# Patient Record
Sex: MALE | Race: WHITE | NOT HISPANIC OR LATINO | Employment: OTHER | URBAN - METROPOLITAN AREA
[De-identification: names, ages, dates, MRNs, and addresses within clinical notes are randomized per-mention and may not be internally consistent; named-entity substitution may affect disease eponyms.]

---

## 2017-11-27 ENCOUNTER — HOSPITAL ENCOUNTER (EMERGENCY)
Facility: HOSPITAL | Age: 77
Discharge: HOME | End: 2017-11-27
Attending: EMERGENCY MEDICINE | Admitting: EMERGENCY MEDICINE

## 2017-11-27 ENCOUNTER — APPOINTMENT (OUTPATIENT)
Dept: GENERAL RADIOLOGY | Facility: HOSPITAL | Age: 77
End: 2017-11-27

## 2017-11-27 VITALS
SYSTOLIC BLOOD PRESSURE: 168 MMHG | HEIGHT: 72 IN | DIASTOLIC BLOOD PRESSURE: 109 MMHG | WEIGHT: 150 LBS | HEART RATE: 72 BPM | RESPIRATION RATE: 16 BRPM | TEMPERATURE: 97.6 F | OXYGEN SATURATION: 98 % | BODY MASS INDEX: 20.32 KG/M2

## 2017-11-27 DIAGNOSIS — E86.0 DEHYDRATION: ICD-10-CM

## 2017-11-27 DIAGNOSIS — R53.1 WEAKNESS: ICD-10-CM

## 2017-11-27 DIAGNOSIS — R25.1 TREMOR: ICD-10-CM

## 2017-11-27 DIAGNOSIS — G20 PARKINSON'S DISEASE (TREMOR, STIFFNESS, SLOW MOTION, UNSTABLE POSTURE) (HCC): Primary | ICD-10-CM

## 2017-11-27 LAB
ALBUMIN SERPL-MCNC: 3.9 G/DL (ref 3.2–4.8)
ALBUMIN/GLOB SERPL: 1.4 G/DL (ref 1.5–2.5)
ALP SERPL-CCNC: 79 U/L (ref 25–100)
ALT SERPL W P-5'-P-CCNC: 3 U/L (ref 7–40)
ANION GAP SERPL CALCULATED.3IONS-SCNC: 4 MMOL/L (ref 3–11)
AST SERPL-CCNC: 14 U/L (ref 0–33)
BASOPHILS # BLD AUTO: 0.03 10*3/MM3 (ref 0–0.2)
BASOPHILS NFR BLD AUTO: 0.4 % (ref 0–1)
BILIRUB SERPL-MCNC: 0.6 MG/DL (ref 0.3–1.2)
BILIRUB UR QL STRIP: NEGATIVE
BUN BLD-MCNC: 22 MG/DL (ref 9–23)
BUN/CREAT SERPL: 27.5 (ref 7–25)
CALCIUM SPEC-SCNC: 8.6 MG/DL (ref 8.7–10.4)
CHLORIDE SERPL-SCNC: 103 MMOL/L (ref 99–109)
CLARITY UR: CLEAR
CO2 SERPL-SCNC: 30 MMOL/L (ref 20–31)
COLOR UR: YELLOW
CREAT BLD-MCNC: 0.8 MG/DL (ref 0.6–1.3)
CRP SERPL-MCNC: 0.01 MG/DL (ref 0–1)
DEPRECATED RDW RBC AUTO: 47.7 FL (ref 37–54)
EOSINOPHIL # BLD AUTO: 0.19 10*3/MM3 (ref 0–0.3)
EOSINOPHIL NFR BLD AUTO: 2.6 % (ref 0–3)
ERYTHROCYTE [DISTWIDTH] IN BLOOD BY AUTOMATED COUNT: 13.1 % (ref 11.3–14.5)
GFR SERPL CREATININE-BSD FRML MDRD: 94 ML/MIN/1.73
GLOBULIN UR ELPH-MCNC: 2.8 GM/DL
GLUCOSE BLD-MCNC: 101 MG/DL (ref 70–100)
GLUCOSE UR STRIP-MCNC: NEGATIVE MG/DL
HCT VFR BLD AUTO: 46.6 % (ref 38.9–50.9)
HGB BLD-MCNC: 15.3 G/DL (ref 13.1–17.5)
HGB UR QL STRIP.AUTO: NEGATIVE
HOLD SPECIMEN: NORMAL
IMM GRANULOCYTES # BLD: 0.01 10*3/MM3 (ref 0–0.03)
IMM GRANULOCYTES NFR BLD: 0.1 % (ref 0–0.6)
INR PPP: 1.04
KETONES UR QL STRIP: ABNORMAL
LEUKOCYTE ESTERASE UR QL STRIP.AUTO: NEGATIVE
LIPASE SERPL-CCNC: 29 U/L (ref 6–51)
LYMPHOCYTES # BLD AUTO: 1.72 10*3/MM3 (ref 0.6–4.8)
LYMPHOCYTES NFR BLD AUTO: 23.9 % (ref 24–44)
MAGNESIUM SERPL-MCNC: 2.2 MG/DL (ref 1.3–2.7)
MCH RBC QN AUTO: 32.8 PG (ref 27–31)
MCHC RBC AUTO-ENTMCNC: 32.8 G/DL (ref 32–36)
MCV RBC AUTO: 100 FL (ref 80–99)
MONOCYTES # BLD AUTO: 0.64 10*3/MM3 (ref 0–1)
MONOCYTES NFR BLD AUTO: 8.9 % (ref 0–12)
NEUTROPHILS # BLD AUTO: 4.61 10*3/MM3 (ref 1.5–8.3)
NEUTROPHILS NFR BLD AUTO: 64.1 % (ref 41–71)
NITRITE UR QL STRIP: NEGATIVE
PH UR STRIP.AUTO: 5.5 [PH] (ref 5–8)
PLATELET # BLD AUTO: 183 10*3/MM3 (ref 150–450)
PMV BLD AUTO: 10.6 FL (ref 6–12)
POTASSIUM BLD-SCNC: 3.6 MMOL/L (ref 3.5–5.5)
PROT SERPL-MCNC: 6.7 G/DL (ref 5.7–8.2)
PROT UR QL STRIP: NEGATIVE
PROTHROMBIN TIME: 11.3 SECONDS (ref 9.6–11.5)
RBC # BLD AUTO: 4.66 10*6/MM3 (ref 4.2–5.76)
SODIUM BLD-SCNC: 137 MMOL/L (ref 132–146)
SP GR UR STRIP: 1.03 (ref 1–1.03)
TSH SERPL DL<=0.05 MIU/L-ACNC: 1.61 MIU/ML (ref 0.35–5.35)
UROBILINOGEN UR QL STRIP: ABNORMAL
WBC NRBC COR # BLD: 7.2 10*3/MM3 (ref 3.5–10.8)
WHOLE BLOOD HOLD SPECIMEN: NORMAL
WHOLE BLOOD HOLD SPECIMEN: NORMAL

## 2017-11-27 PROCEDURE — 96360 HYDRATION IV INFUSION INIT: CPT

## 2017-11-27 PROCEDURE — 83690 ASSAY OF LIPASE: CPT | Performed by: EMERGENCY MEDICINE

## 2017-11-27 PROCEDURE — 99284 EMERGENCY DEPT VISIT MOD MDM: CPT

## 2017-11-27 PROCEDURE — 84443 ASSAY THYROID STIM HORMONE: CPT | Performed by: EMERGENCY MEDICINE

## 2017-11-27 PROCEDURE — 93005 ELECTROCARDIOGRAM TRACING: CPT | Performed by: EMERGENCY MEDICINE

## 2017-11-27 PROCEDURE — 83735 ASSAY OF MAGNESIUM: CPT | Performed by: EMERGENCY MEDICINE

## 2017-11-27 PROCEDURE — 86140 C-REACTIVE PROTEIN: CPT | Performed by: EMERGENCY MEDICINE

## 2017-11-27 PROCEDURE — 81003 URINALYSIS AUTO W/O SCOPE: CPT | Performed by: EMERGENCY MEDICINE

## 2017-11-27 PROCEDURE — 80053 COMPREHEN METABOLIC PANEL: CPT | Performed by: EMERGENCY MEDICINE

## 2017-11-27 PROCEDURE — 71010 HC CHEST PA OR AP: CPT

## 2017-11-27 PROCEDURE — 85025 COMPLETE CBC W/AUTO DIFF WBC: CPT | Performed by: EMERGENCY MEDICINE

## 2017-11-27 PROCEDURE — 85610 PROTHROMBIN TIME: CPT | Performed by: EMERGENCY MEDICINE

## 2017-11-27 RX ORDER — SODIUM CHLORIDE 0.9 % (FLUSH) 0.9 %
10 SYRINGE (ML) INJECTION AS NEEDED
Status: DISCONTINUED | OUTPATIENT
Start: 2017-11-27 | End: 2017-11-27 | Stop reason: HOSPADM

## 2017-11-27 RX ORDER — SODIUM CHLORIDE 9 MG/ML
125 INJECTION, SOLUTION INTRAVENOUS CONTINUOUS
Status: DISCONTINUED | OUTPATIENT
Start: 2017-11-27 | End: 2017-11-27 | Stop reason: HOSPADM

## 2017-11-27 RX ORDER — CLONAZEPAM 1 MG/1
1 TABLET ORAL 2 TIMES DAILY
COMMUNITY

## 2017-11-27 RX ORDER — CLONAZEPAM 0.5 MG/1
0.5 TABLET ORAL ONCE
Status: COMPLETED | OUTPATIENT
Start: 2017-11-27 | End: 2017-11-27

## 2017-11-27 RX ADMIN — SODIUM CHLORIDE 125 ML/HR: 9 INJECTION, SOLUTION INTRAVENOUS at 07:04

## 2017-11-27 RX ADMIN — CLONAZEPAM 0.5 MG: 0.5 TABLET ORAL at 06:51

## 2017-11-27 RX ADMIN — SODIUM CHLORIDE 1000 ML: 9 INJECTION, SOLUTION INTRAVENOUS at 06:38

## 2020-06-16 ENCOUNTER — TRANSCRIBE ORDERS (OUTPATIENT)
Dept: ADMINISTRATIVE | Facility: HOSPITAL | Age: 80
End: 2020-06-16

## 2020-06-16 ENCOUNTER — HOSPITAL ENCOUNTER (OUTPATIENT)
Dept: GENERAL RADIOLOGY | Facility: HOSPITAL | Age: 80
Discharge: HOME | End: 2020-06-16
Admitting: PHYSICIAN ASSISTANT

## 2020-06-16 DIAGNOSIS — S99.912A LEFT ANKLE INJURY, INITIAL ENCOUNTER: Primary | ICD-10-CM

## 2020-06-16 DIAGNOSIS — S99.912A LEFT ANKLE INJURY, INITIAL ENCOUNTER: ICD-10-CM

## 2020-06-16 PROCEDURE — 73610 X-RAY EXAM OF ANKLE: CPT

## 2020-10-28 ENCOUNTER — APPOINTMENT (OUTPATIENT)
Dept: GENERAL RADIOLOGY | Facility: HOSPITAL | Age: 80
End: 2020-10-28

## 2020-10-28 ENCOUNTER — HOSPITAL ENCOUNTER (OUTPATIENT)
Facility: HOSPITAL | Age: 80
Setting detail: OBSERVATION
Discharge: HOME | End: 2020-10-30
Attending: EMERGENCY MEDICINE | Admitting: FAMILY MEDICINE

## 2020-10-28 DIAGNOSIS — R55 SYNCOPE, UNSPECIFIED SYNCOPE TYPE: Primary | ICD-10-CM

## 2020-10-28 DIAGNOSIS — I10 ESSENTIAL HYPERTENSION: ICD-10-CM

## 2020-10-28 DIAGNOSIS — R55 SYNCOPE AND COLLAPSE: ICD-10-CM

## 2020-10-28 DIAGNOSIS — R03.0 ELEVATED BLOOD PRESSURE READING: ICD-10-CM

## 2020-10-28 DIAGNOSIS — I95.1 ORTHOSTASIS: ICD-10-CM

## 2020-10-28 DIAGNOSIS — G24.01 TARDIVE DYSKINESIA: ICD-10-CM

## 2020-10-28 DIAGNOSIS — G20 PARKINSON'S DISEASE (HCC): Chronic | ICD-10-CM

## 2020-10-28 PROBLEM — D72.829 LEUKOCYTOSIS: Status: ACTIVE | Noted: 2020-10-28

## 2020-10-28 LAB
ALBUMIN SERPL-MCNC: 4.1 G/DL (ref 3.5–5.2)
ALBUMIN/GLOB SERPL: 1.5 G/DL
ALP SERPL-CCNC: 102 U/L (ref 39–117)
ALT SERPL W P-5'-P-CCNC: <5 U/L (ref 1–41)
AMMONIA BLD-SCNC: 20 UMOL/L (ref 16–60)
ANION GAP SERPL CALCULATED.3IONS-SCNC: 11 MMOL/L (ref 5–15)
AST SERPL-CCNC: 13 U/L (ref 1–40)
BACTERIA UR QL AUTO: ABNORMAL /HPF
BASOPHILS # BLD AUTO: 0.03 10*3/MM3 (ref 0–0.2)
BASOPHILS NFR BLD AUTO: 0.3 % (ref 0–1.5)
BILIRUB SERPL-MCNC: 0.7 MG/DL (ref 0–1.2)
BILIRUB UR QL STRIP: NEGATIVE
BUN SERPL-MCNC: 18 MG/DL (ref 8–23)
BUN/CREAT SERPL: 18.4 (ref 7–25)
CALCIUM SPEC-SCNC: 9.1 MG/DL (ref 8.6–10.5)
CHLORIDE SERPL-SCNC: 101 MMOL/L (ref 98–107)
CLARITY UR: CLEAR
CO2 SERPL-SCNC: 29 MMOL/L (ref 22–29)
COLOR UR: YELLOW
CREAT SERPL-MCNC: 0.98 MG/DL (ref 0.76–1.27)
DEPRECATED RDW RBC AUTO: 44.4 FL (ref 37–54)
EOSINOPHIL # BLD AUTO: 0.06 10*3/MM3 (ref 0–0.4)
EOSINOPHIL NFR BLD AUTO: 0.5 % (ref 0.3–6.2)
ERYTHROCYTE [DISTWIDTH] IN BLOOD BY AUTOMATED COUNT: 12.1 % (ref 12.3–15.4)
GFR SERPL CREATININE-BSD FRML MDRD: 74 ML/MIN/1.73
GLOBULIN UR ELPH-MCNC: 2.8 GM/DL
GLUCOSE SERPL-MCNC: 142 MG/DL (ref 65–99)
GLUCOSE UR STRIP-MCNC: NEGATIVE MG/DL
HCT VFR BLD AUTO: 45.9 % (ref 37.5–51)
HGB BLD-MCNC: 15 G/DL (ref 13–17.7)
HGB UR QL STRIP.AUTO: NEGATIVE
HOLD SPECIMEN: NORMAL
HOLD SPECIMEN: NORMAL
HYALINE CASTS UR QL AUTO: ABNORMAL /LPF
IMM GRANULOCYTES # BLD AUTO: 0.05 10*3/MM3 (ref 0–0.05)
IMM GRANULOCYTES NFR BLD AUTO: 0.4 % (ref 0–0.5)
KETONES UR QL STRIP: ABNORMAL
LEUKOCYTE ESTERASE UR QL STRIP.AUTO: ABNORMAL
LYMPHOCYTES # BLD AUTO: 1.72 10*3/MM3 (ref 0.7–3.1)
LYMPHOCYTES NFR BLD AUTO: 15.2 % (ref 19.6–45.3)
MAGNESIUM SERPL-MCNC: 2.1 MG/DL (ref 1.6–2.4)
MCH RBC QN AUTO: 32.7 PG (ref 26.6–33)
MCHC RBC AUTO-ENTMCNC: 32.7 G/DL (ref 31.5–35.7)
MCV RBC AUTO: 100 FL (ref 79–97)
MONOCYTES # BLD AUTO: 0.74 10*3/MM3 (ref 0.1–0.9)
MONOCYTES NFR BLD AUTO: 6.5 % (ref 5–12)
NEUTROPHILS NFR BLD AUTO: 77.1 % (ref 42.7–76)
NEUTROPHILS NFR BLD AUTO: 8.74 10*3/MM3 (ref 1.7–7)
NITRITE UR QL STRIP: NEGATIVE
NRBC BLD AUTO-RTO: 0 /100 WBC (ref 0–0.2)
PH UR STRIP.AUTO: 6 [PH] (ref 5–8)
PLATELET # BLD AUTO: 193 10*3/MM3 (ref 140–450)
PMV BLD AUTO: 10 FL (ref 6–12)
POTASSIUM SERPL-SCNC: 3.7 MMOL/L (ref 3.5–5.2)
PROCALCITONIN SERPL-MCNC: <0.02 NG/ML (ref 0–0.25)
PROT SERPL-MCNC: 6.9 G/DL (ref 6–8.5)
PROT UR QL STRIP: ABNORMAL
RBC # BLD AUTO: 4.59 10*6/MM3 (ref 4.14–5.8)
RBC # UR: ABNORMAL /HPF
REF LAB TEST METHOD: ABNORMAL
SODIUM SERPL-SCNC: 141 MMOL/L (ref 136–145)
SP GR UR STRIP: 1.01 (ref 1–1.03)
SQUAMOUS #/AREA URNS HPF: ABNORMAL /HPF
TROPONIN T SERPL-MCNC: <0.01 NG/ML (ref 0–0.03)
UROBILINOGEN UR QL STRIP: ABNORMAL
WBC # BLD AUTO: 11.34 10*3/MM3 (ref 3.4–10.8)
WBC UR QL AUTO: ABNORMAL /HPF
WHOLE BLOOD HOLD SPECIMEN: NORMAL
WHOLE BLOOD HOLD SPECIMEN: NORMAL
YEAST URNS QL MICRO: ABNORMAL /HPF

## 2020-10-28 PROCEDURE — 71045 X-RAY EXAM CHEST 1 VIEW: CPT

## 2020-10-28 PROCEDURE — 84484 ASSAY OF TROPONIN QUANT: CPT | Performed by: EMERGENCY MEDICINE

## 2020-10-28 PROCEDURE — 85025 COMPLETE CBC W/AUTO DIFF WBC: CPT | Performed by: EMERGENCY MEDICINE

## 2020-10-28 PROCEDURE — 25010000002 HYDRALAZINE PER 20 MG: Performed by: NURSE PRACTITIONER

## 2020-10-28 PROCEDURE — 99285 EMERGENCY DEPT VISIT HI MDM: CPT

## 2020-10-28 PROCEDURE — G0378 HOSPITAL OBSERVATION PER HR: HCPCS

## 2020-10-28 PROCEDURE — 99220 PR INITIAL OBSERVATION CARE/DAY 70 MINUTES: CPT | Performed by: INTERNAL MEDICINE

## 2020-10-28 PROCEDURE — 83735 ASSAY OF MAGNESIUM: CPT | Performed by: EMERGENCY MEDICINE

## 2020-10-28 PROCEDURE — 96372 THER/PROPH/DIAG INJ SC/IM: CPT

## 2020-10-28 PROCEDURE — 87086 URINE CULTURE/COLONY COUNT: CPT | Performed by: FAMILY MEDICINE

## 2020-10-28 PROCEDURE — 25010000002 ENOXAPARIN PER 10 MG: Performed by: NURSE PRACTITIONER

## 2020-10-28 PROCEDURE — 81001 URINALYSIS AUTO W/SCOPE: CPT | Performed by: EMERGENCY MEDICINE

## 2020-10-28 PROCEDURE — 25010000002 MIDAZOLAM PER 1 MG: Performed by: EMERGENCY MEDICINE

## 2020-10-28 PROCEDURE — 93005 ELECTROCARDIOGRAM TRACING: CPT | Performed by: EMERGENCY MEDICINE

## 2020-10-28 PROCEDURE — 87077 CULTURE AEROBIC IDENTIFY: CPT | Performed by: FAMILY MEDICINE

## 2020-10-28 PROCEDURE — 82140 ASSAY OF AMMONIA: CPT | Performed by: EMERGENCY MEDICINE

## 2020-10-28 PROCEDURE — 84145 PROCALCITONIN (PCT): CPT | Performed by: NURSE PRACTITIONER

## 2020-10-28 PROCEDURE — 80053 COMPREHEN METABOLIC PANEL: CPT | Performed by: EMERGENCY MEDICINE

## 2020-10-28 PROCEDURE — C9803 HOPD COVID-19 SPEC COLLECT: HCPCS

## 2020-10-28 PROCEDURE — 96375 TX/PRO/DX INJ NEW DRUG ADDON: CPT

## 2020-10-28 PROCEDURE — U0004 COV-19 TEST NON-CDC HGH THRU: HCPCS | Performed by: NURSE PRACTITIONER

## 2020-10-28 PROCEDURE — 99204 OFFICE O/P NEW MOD 45 MIN: CPT | Performed by: PSYCHIATRY & NEUROLOGY

## 2020-10-28 RX ORDER — ONDANSETRON 4 MG/1
4 TABLET, FILM COATED ORAL EVERY 6 HOURS PRN
Status: DISCONTINUED | OUTPATIENT
Start: 2020-10-28 | End: 2020-10-30 | Stop reason: HOSPADM

## 2020-10-28 RX ORDER — SODIUM CHLORIDE 0.9 % (FLUSH) 0.9 %
10 SYRINGE (ML) INJECTION AS NEEDED
Status: DISCONTINUED | OUTPATIENT
Start: 2020-10-28 | End: 2020-10-30 | Stop reason: HOSPADM

## 2020-10-28 RX ORDER — DOCUSATE SODIUM 100 MG/1
100 CAPSULE, LIQUID FILLED ORAL 2 TIMES DAILY
Status: DISCONTINUED | OUTPATIENT
Start: 2020-10-28 | End: 2020-10-30 | Stop reason: HOSPADM

## 2020-10-28 RX ORDER — HYDRALAZINE HYDROCHLORIDE 20 MG/ML
5 INJECTION INTRAMUSCULAR; INTRAVENOUS ONCE
Status: COMPLETED | OUTPATIENT
Start: 2020-10-28 | End: 2020-10-28

## 2020-10-28 RX ORDER — CLONAZEPAM 0.5 MG/1
0.5 TABLET ORAL 2 TIMES DAILY PRN
Status: DISCONTINUED | OUTPATIENT
Start: 2020-10-28 | End: 2020-10-30 | Stop reason: HOSPADM

## 2020-10-28 RX ORDER — ACETAMINOPHEN 325 MG/1
650 TABLET ORAL EVERY 4 HOURS PRN
Status: DISCONTINUED | OUTPATIENT
Start: 2020-10-28 | End: 2020-10-30 | Stop reason: HOSPADM

## 2020-10-28 RX ORDER — FLUDROCORTISONE ACETATE 0.1 MG/1
0.1 TABLET ORAL DAILY
Status: ON HOLD | COMMUNITY
End: 2020-10-30 | Stop reason: SDUPTHER

## 2020-10-28 RX ORDER — SODIUM CHLORIDE 0.9 % (FLUSH) 0.9 %
10 SYRINGE (ML) INJECTION EVERY 12 HOURS SCHEDULED
Status: DISCONTINUED | OUTPATIENT
Start: 2020-10-28 | End: 2020-10-30 | Stop reason: HOSPADM

## 2020-10-28 RX ORDER — CHOLECALCIFEROL (VITAMIN D3) 125 MCG
5 CAPSULE ORAL NIGHTLY PRN
Status: DISCONTINUED | OUTPATIENT
Start: 2020-10-28 | End: 2020-10-30 | Stop reason: HOSPADM

## 2020-10-28 RX ORDER — CALCIUM CARBONATE 750 MG/1
750 TABLET, CHEWABLE ORAL 2 TIMES DAILY PRN
Status: DISCONTINUED | OUTPATIENT
Start: 2020-10-28 | End: 2020-10-30 | Stop reason: HOSPADM

## 2020-10-28 RX ORDER — FLUDROCORTISONE ACETATE 0.1 MG/1
0.1 TABLET ORAL DAILY
Status: DISCONTINUED | OUTPATIENT
Start: 2020-10-28 | End: 2020-10-30 | Stop reason: HOSPADM

## 2020-10-28 RX ORDER — ONDANSETRON 2 MG/ML
4 INJECTION INTRAMUSCULAR; INTRAVENOUS EVERY 6 HOURS PRN
Status: DISCONTINUED | OUTPATIENT
Start: 2020-10-28 | End: 2020-10-30 | Stop reason: HOSPADM

## 2020-10-28 RX ORDER — MIDAZOLAM HYDROCHLORIDE 1 MG/ML
2 INJECTION INTRAMUSCULAR; INTRAVENOUS ONCE
Status: COMPLETED | OUTPATIENT
Start: 2020-10-28 | End: 2020-10-28

## 2020-10-28 RX ADMIN — CARBIDOPA AND LEVODOPA 1 TABLET: 25; 100 TABLET ORAL at 20:30

## 2020-10-28 RX ADMIN — DOCUSATE SODIUM 100 MG: 100 CAPSULE ORAL at 20:30

## 2020-10-28 RX ADMIN — MELATONIN TAB 5 MG 5 MG: 5 TAB at 20:30

## 2020-10-28 RX ADMIN — HYDRALAZINE HYDROCHLORIDE 5 MG: 20 INJECTION, SOLUTION INTRAMUSCULAR; INTRAVENOUS at 22:32

## 2020-10-28 RX ADMIN — FLUDROCORTISONE ACETATE 0.1 MG: 0.1 TABLET ORAL at 20:32

## 2020-10-28 RX ADMIN — ENOXAPARIN SODIUM 40 MG: 40 INJECTION SUBCUTANEOUS at 18:57

## 2020-10-28 RX ADMIN — SODIUM CHLORIDE, PRESERVATIVE FREE 10 ML: 5 INJECTION INTRAVENOUS at 20:30

## 2020-10-28 RX ADMIN — SODIUM CHLORIDE 1000 ML: 9 INJECTION, SOLUTION INTRAVENOUS at 14:24

## 2020-10-28 RX ADMIN — MIDAZOLAM 2 MG: 1 INJECTION INTRAMUSCULAR; INTRAVENOUS at 14:02

## 2020-10-29 ENCOUNTER — APPOINTMENT (OUTPATIENT)
Dept: CARDIOLOGY | Facility: HOSPITAL | Age: 80
End: 2020-10-29

## 2020-10-29 ENCOUNTER — APPOINTMENT (OUTPATIENT)
Dept: CT IMAGING | Facility: HOSPITAL | Age: 80
End: 2020-10-29

## 2020-10-29 LAB
ANION GAP SERPL CALCULATED.3IONS-SCNC: 10 MMOL/L (ref 5–15)
BH CV ECHO MEAS - AO MAX PG (FULL): 2.8 MMHG
BH CV ECHO MEAS - AO MAX PG: 7.2 MMHG
BH CV ECHO MEAS - AO MEAN PG (FULL): 1.8 MMHG
BH CV ECHO MEAS - AO MEAN PG: 4.2 MMHG
BH CV ECHO MEAS - AO ROOT DIAM: 3.8 CM
BH CV ECHO MEAS - AO V2 MAX: 134.5 CM/SEC
BH CV ECHO MEAS - AO V2 MEAN: 95.2 CM/SEC
BH CV ECHO MEAS - AO V2 VTI: 26.2 CM
BH CV ECHO MEAS - BSA(HAYCOCK): 1.9 M^2
BH CV ECHO MEAS - BSA(HAYCOCK): 1.9 M^2
BH CV ECHO MEAS - BSA: 1.9 M^2
BH CV ECHO MEAS - BSA: 1.9 M^2
BH CV ECHO MEAS - BZI_BMI: 20.3 KILOGRAMS/M^2
BH CV ECHO MEAS - BZI_BMI: 20.3 KILOGRAMS/M^2
BH CV ECHO MEAS - BZI_METRIC_HEIGHT: 182.9 CM
BH CV ECHO MEAS - BZI_METRIC_HEIGHT: 182.9 CM
BH CV ECHO MEAS - BZI_METRIC_WEIGHT: 68 KG
BH CV ECHO MEAS - BZI_METRIC_WEIGHT: 68 KG
BH CV ECHO MEAS - EDV(CUBED): 78 ML
BH CV ECHO MEAS - EDV(MOD-SP2): 78 ML
BH CV ECHO MEAS - EDV(MOD-SP4): 130 ML
BH CV ECHO MEAS - EF(MOD-BP): 63 %
BH CV ECHO MEAS - EF(MOD-SP2): 50 %
BH CV ECHO MEAS - EF(MOD-SP4): 73.8 %
BH CV ECHO MEAS - ESV(CUBED): 39 ML
BH CV ECHO MEAS - ESV(MOD-SP2): 39 ML
BH CV ECHO MEAS - ESV(MOD-SP4): 34 ML
BH CV ECHO MEAS - IVSD: 1.2 CM
BH CV ECHO MEAS - LAD MAJOR: 6 CM
BH CV ECHO MEAS - LAT PEAK E' VEL: 10.4 CM/SEC
BH CV ECHO MEAS - LATERAL E/E' RATIO: 6.5
BH CV ECHO MEAS - LV DIASTOLIC VOL/BSA (35-75): 69 ML/M^2
BH CV ECHO MEAS - LV MAX PG: 4.5 MMHG
BH CV ECHO MEAS - LV MEAN PG: 2.4 MMHG
BH CV ECHO MEAS - LV SYSTOLIC VOL/BSA (12-30): 18 ML/M^2
BH CV ECHO MEAS - LV V1 MAX: 105.6 CM/SEC
BH CV ECHO MEAS - LV V1 MEAN: 69.3 CM/SEC
BH CV ECHO MEAS - LV V1 VTI: 22.7 CM
BH CV ECHO MEAS - LVIDD: 3.9 CM
BH CV ECHO MEAS - LVIDS: 2.8 CM
BH CV ECHO MEAS - LVLD AP2: 8.5 CM
BH CV ECHO MEAS - LVLD AP4: 9.5 CM
BH CV ECHO MEAS - LVLS AP2: 6.9 CM
BH CV ECHO MEAS - LVLS AP4: 8 CM
BH CV ECHO MEAS - LVPWD: 1 CM
BH CV ECHO MEAS - MED PEAK E' VEL: 7.6 CM/SEC
BH CV ECHO MEAS - MEDIAL E/E' RATIO: 8.9
BH CV ECHO MEAS - MV A MAX VEL: 87.4 CM/SEC
BH CV ECHO MEAS - MV DEC SLOPE: 429.9 CM/SEC^2
BH CV ECHO MEAS - MV DEC TIME: 0.2 SEC
BH CV ECHO MEAS - MV E MAX VEL: 68.6 CM/SEC
BH CV ECHO MEAS - MV E/A: 0.79
BH CV ECHO MEAS - MV MAX PG: 2.9 MMHG
BH CV ECHO MEAS - MV MEAN PG: 1.4 MMHG
BH CV ECHO MEAS - MV P1/2T MAX VEL: 85.1 CM/SEC
BH CV ECHO MEAS - MV P1/2T: 58 MSEC
BH CV ECHO MEAS - MV V2 MAX: 85.1 CM/SEC
BH CV ECHO MEAS - MV V2 MEAN: 55.6 CM/SEC
BH CV ECHO MEAS - MV V2 VTI: 21.5 CM
BH CV ECHO MEAS - MVA P1/2T LCG: 2.6 CM^2
BH CV ECHO MEAS - MVA(P1/2T): 3.8 CM^2
BH CV ECHO MEAS - PA ACC SLOPE: 997.4 CM/SEC^2
BH CV ECHO MEAS - PA ACC TIME: 0.09 SEC
BH CV ECHO MEAS - PA MAX PG: 5.7 MMHG
BH CV ECHO MEAS - PA PR(ACCEL): 37.8 MMHG
BH CV ECHO MEAS - PA V2 MAX: 119.5 CM/SEC
BH CV ECHO MEAS - RAP SYSTOLE: 3 MMHG
BH CV ECHO MEAS - RVSP: 12 MMHG
BH CV ECHO MEAS - SI(MOD-SP2): 20.7 ML/M^2
BH CV ECHO MEAS - SI(MOD-SP4): 50.9 ML/M^2
BH CV ECHO MEAS - SV(MOD-SP2): 39 ML
BH CV ECHO MEAS - SV(MOD-SP4): 96 ML
BH CV ECHO MEAS - TAPSE (>1.6): 2.3 CM
BH CV ECHO MEAS - TR MAX PG: 9 MMHG
BH CV ECHO MEAS - TR MAX VEL: 145.4 CM/SEC
BH CV ECHO MEASUREMENTS AVERAGE E/E' RATIO: 7.62
BH CV VAS BP LEFT ARM: NORMAL MMHG
BH CV XLRA - RV BASE: 3.4 CM
BH CV XLRA - RV LENGTH: 6 CM
BH CV XLRA - RV MID: 2.2 CM
BH CV XLRA - TDI S': 17.9 CM/SEC
BH CV XLRA MEAS LEFT DIST CCA EDV: 19.9 CM/SEC
BH CV XLRA MEAS LEFT DIST CCA PSV: 78.3 CM/SEC
BH CV XLRA MEAS LEFT DIST ICA EDV: 16.8 CM/SEC
BH CV XLRA MEAS LEFT DIST ICA PSV: 56.6 CM/SEC
BH CV XLRA MEAS LEFT ICA/CCA RATIO: 0.86
BH CV XLRA MEAS LEFT MID CCA EDV: 18.7 CM/SEC
BH CV XLRA MEAS LEFT MID CCA PSV: 78.3 CM/SEC
BH CV XLRA MEAS LEFT MID ICA EDV: 15.7 CM/SEC
BH CV XLRA MEAS LEFT MID ICA PSV: 54.1 CM/SEC
BH CV XLRA MEAS LEFT PROX CCA EDV: 18.7 CM/SEC
BH CV XLRA MEAS LEFT PROX CCA PSV: 84.9 CM/SEC
BH CV XLRA MEAS LEFT PROX ECA EDV: 20.4 CM/SEC
BH CV XLRA MEAS LEFT PROX ECA PSV: 89.9 CM/SEC
BH CV XLRA MEAS LEFT PROX ICA EDV: 19.6 CM/SEC
BH CV XLRA MEAS LEFT PROX ICA PSV: 67.7 CM/SEC
BH CV XLRA MEAS LEFT PROX SCLA EDV: 9.4 CM/SEC
BH CV XLRA MEAS LEFT PROX SCLA PSV: 98.7 CM/SEC
BH CV XLRA MEAS LEFT VERTEBRAL A EDV: 11.5 CM/SEC
BH CV XLRA MEAS LEFT VERTEBRAL A PSV: 37.9 CM/SEC
BH CV XLRA MEAS RIGHT DIST CCA EDV: 21 CM/SEC
BH CV XLRA MEAS RIGHT DIST CCA PSV: 84.9 CM/SEC
BH CV XLRA MEAS RIGHT DIST ICA EDV: 21 CM/SEC
BH CV XLRA MEAS RIGHT DIST ICA PSV: 58 CM/SEC
BH CV XLRA MEAS RIGHT ICA/CCA RATIO: 0.7
BH CV XLRA MEAS RIGHT MID CCA EDV: 17.6 CM/SEC
BH CV XLRA MEAS RIGHT MID CCA PSV: 73.3 CM/SEC
BH CV XLRA MEAS RIGHT MID ICA EDV: 17.1 CM/SEC
BH CV XLRA MEAS RIGHT MID ICA PSV: 53.8 CM/SEC
BH CV XLRA MEAS RIGHT PROX CCA EDV: 14.9 CM/SEC
BH CV XLRA MEAS RIGHT PROX CCA PSV: 73.3 CM/SEC
BH CV XLRA MEAS RIGHT PROX ECA EDV: 22.1 CM/SEC
BH CV XLRA MEAS RIGHT PROX ECA PSV: 113 CM/SEC
BH CV XLRA MEAS RIGHT PROX ICA EDV: 12.2 CM/SEC
BH CV XLRA MEAS RIGHT PROX ICA PSV: 51 CM/SEC
BH CV XLRA MEAS RIGHT PROX SCLA PSV: 57.9 CM/SEC
BH CV XLRA MEAS RIGHT VERTEBRAL A EDV: 9.6 CM/SEC
BH CV XLRA MEAS RIGHT VERTEBRAL A PSV: 26.9 CM/SEC
BUN SERPL-MCNC: 15 MG/DL (ref 8–23)
BUN/CREAT SERPL: 20.8 (ref 7–25)
CALCIUM SPEC-SCNC: 9.1 MG/DL (ref 8.6–10.5)
CHLORIDE SERPL-SCNC: 99 MMOL/L (ref 98–107)
CO2 SERPL-SCNC: 28 MMOL/L (ref 22–29)
CREAT SERPL-MCNC: 0.72 MG/DL (ref 0.76–1.27)
DEPRECATED RDW RBC AUTO: 44.1 FL (ref 37–54)
ERYTHROCYTE [DISTWIDTH] IN BLOOD BY AUTOMATED COUNT: 12.1 % (ref 12.3–15.4)
GFR SERPL CREATININE-BSD FRML MDRD: 106 ML/MIN/1.73
GLUCOSE SERPL-MCNC: 110 MG/DL (ref 65–99)
HBA1C MFR BLD: 5.6 % (ref 4.8–5.6)
HCT VFR BLD AUTO: 46.4 % (ref 37.5–51)
HGB BLD-MCNC: 15 G/DL (ref 13–17.7)
LEFT ARM BP: NORMAL MMHG
MCH RBC QN AUTO: 31.6 PG (ref 26.6–33)
MCHC RBC AUTO-ENTMCNC: 32.3 G/DL (ref 31.5–35.7)
MCV RBC AUTO: 97.9 FL (ref 79–97)
PLATELET # BLD AUTO: 197 10*3/MM3 (ref 140–450)
PMV BLD AUTO: 10.7 FL (ref 6–12)
POTASSIUM SERPL-SCNC: 3.5 MMOL/L (ref 3.5–5.2)
QT INTERVAL: 364 MS
QTC INTERVAL: 459 MS
RBC # BLD AUTO: 4.74 10*6/MM3 (ref 4.14–5.8)
SARS-COV-2 RNA RESP QL NAA+PROBE: NOT DETECTED
SODIUM SERPL-SCNC: 137 MMOL/L (ref 136–145)
WBC # BLD AUTO: 11.37 10*3/MM3 (ref 3.4–10.8)

## 2020-10-29 PROCEDURE — 99225 PR SBSQ OBSERVATION CARE/DAY 25 MINUTES: CPT | Performed by: FAMILY MEDICINE

## 2020-10-29 PROCEDURE — 93306 TTE W/DOPPLER COMPLETE: CPT | Performed by: INTERNAL MEDICINE

## 2020-10-29 PROCEDURE — 93880 EXTRACRANIAL BILAT STUDY: CPT | Performed by: INTERNAL MEDICINE

## 2020-10-29 PROCEDURE — 70450 CT HEAD/BRAIN W/O DYE: CPT

## 2020-10-29 PROCEDURE — 83036 HEMOGLOBIN GLYCOSYLATED A1C: CPT | Performed by: NURSE PRACTITIONER

## 2020-10-29 PROCEDURE — 25010000002 ENOXAPARIN PER 10 MG: Performed by: NURSE PRACTITIONER

## 2020-10-29 PROCEDURE — 93306 TTE W/DOPPLER COMPLETE: CPT

## 2020-10-29 PROCEDURE — G0378 HOSPITAL OBSERVATION PER HR: HCPCS

## 2020-10-29 PROCEDURE — 25010000002 LORAZEPAM PER 2 MG: Performed by: NURSE PRACTITIONER

## 2020-10-29 PROCEDURE — 97166 OT EVAL MOD COMPLEX 45 MIN: CPT

## 2020-10-29 PROCEDURE — 80048 BASIC METABOLIC PNL TOTAL CA: CPT | Performed by: NURSE PRACTITIONER

## 2020-10-29 PROCEDURE — 25010000002 CEFTRIAXONE PER 250 MG: Performed by: FAMILY MEDICINE

## 2020-10-29 PROCEDURE — 96372 THER/PROPH/DIAG INJ SC/IM: CPT

## 2020-10-29 PROCEDURE — 85027 COMPLETE CBC AUTOMATED: CPT | Performed by: NURSE PRACTITIONER

## 2020-10-29 PROCEDURE — 96375 TX/PRO/DX INJ NEW DRUG ADDON: CPT

## 2020-10-29 PROCEDURE — 93880 EXTRACRANIAL BILAT STUDY: CPT

## 2020-10-29 PROCEDURE — 96365 THER/PROPH/DIAG IV INF INIT: CPT

## 2020-10-29 PROCEDURE — 99214 OFFICE O/P EST MOD 30 MIN: CPT | Performed by: PSYCHIATRY & NEUROLOGY

## 2020-10-29 RX ORDER — AMLODIPINE BESYLATE 5 MG/1
5 TABLET ORAL
Status: DISCONTINUED | OUTPATIENT
Start: 2020-10-29 | End: 2020-10-29

## 2020-10-29 RX ORDER — LABETALOL HYDROCHLORIDE 5 MG/ML
10 INJECTION, SOLUTION INTRAVENOUS EVERY 4 HOURS PRN
Status: DISCONTINUED | OUTPATIENT
Start: 2020-10-29 | End: 2020-10-30 | Stop reason: HOSPADM

## 2020-10-29 RX ORDER — AMLODIPINE BESYLATE 5 MG/1
5 TABLET ORAL ONCE
Status: COMPLETED | OUTPATIENT
Start: 2020-10-29 | End: 2020-10-29

## 2020-10-29 RX ORDER — AMLODIPINE BESYLATE 10 MG/1
10 TABLET ORAL
Status: DISCONTINUED | OUTPATIENT
Start: 2020-10-30 | End: 2020-10-30 | Stop reason: HOSPADM

## 2020-10-29 RX ORDER — LISINOPRIL 20 MG/1
20 TABLET ORAL
Status: DISCONTINUED | OUTPATIENT
Start: 2020-10-29 | End: 2020-10-30 | Stop reason: HOSPADM

## 2020-10-29 RX ORDER — LORAZEPAM 2 MG/ML
0.25 INJECTION INTRAMUSCULAR ONCE
Status: COMPLETED | OUTPATIENT
Start: 2020-10-29 | End: 2020-10-29

## 2020-10-29 RX ADMIN — ENOXAPARIN SODIUM 40 MG: 40 INJECTION SUBCUTANEOUS at 07:51

## 2020-10-29 RX ADMIN — MELATONIN TAB 5 MG 5 MG: 5 TAB at 20:19

## 2020-10-29 RX ADMIN — AMLODIPINE BESYLATE 5 MG: 5 TABLET ORAL at 08:50

## 2020-10-29 RX ADMIN — CARBIDOPA AND LEVODOPA 1 TABLET: 25; 100 TABLET ORAL at 20:19

## 2020-10-29 RX ADMIN — LORAZEPAM 0.25 MG: 2 INJECTION INTRAMUSCULAR; INTRAVENOUS at 22:00

## 2020-10-29 RX ADMIN — AMLODIPINE BESYLATE 5 MG: 5 TABLET ORAL at 12:21

## 2020-10-29 RX ADMIN — CEFTRIAXONE 1 G: 1 INJECTION, POWDER, FOR SOLUTION INTRAMUSCULAR; INTRAVENOUS at 09:11

## 2020-10-29 RX ADMIN — FLUDROCORTISONE ACETATE 0.1 MG: 0.1 TABLET ORAL at 07:52

## 2020-10-29 RX ADMIN — CARBIDOPA AND LEVODOPA 1 TABLET: 25; 100 TABLET ORAL at 07:52

## 2020-10-29 RX ADMIN — LISINOPRIL 20 MG: 20 TABLET ORAL at 12:21

## 2020-10-29 RX ADMIN — CLONAZEPAM 0.5 MG: 0.5 TABLET ORAL at 18:13

## 2020-10-29 RX ADMIN — DOCUSATE SODIUM 100 MG: 100 CAPSULE ORAL at 07:52

## 2020-10-29 RX ADMIN — ACETAMINOPHEN 650 MG: 325 TABLET, FILM COATED ORAL at 20:19

## 2020-10-29 RX ADMIN — SODIUM CHLORIDE, PRESERVATIVE FREE 10 ML: 5 INJECTION INTRAVENOUS at 20:19

## 2020-10-29 RX ADMIN — SODIUM CHLORIDE, PRESERVATIVE FREE 10 ML: 5 INJECTION INTRAVENOUS at 07:50

## 2020-10-29 RX ADMIN — DOCUSATE SODIUM 100 MG: 100 CAPSULE ORAL at 20:19

## 2020-10-30 VITALS
WEIGHT: 150 LBS | HEART RATE: 92 BPM | OXYGEN SATURATION: 94 % | TEMPERATURE: 98.3 F | HEIGHT: 71 IN | RESPIRATION RATE: 18 BRPM | SYSTOLIC BLOOD PRESSURE: 147 MMHG | BODY MASS INDEX: 21 KG/M2 | DIASTOLIC BLOOD PRESSURE: 103 MMHG

## 2020-10-30 PROBLEM — I10 ESSENTIAL HYPERTENSION: Status: ACTIVE | Noted: 2020-10-30

## 2020-10-30 PROBLEM — D72.829 LEUKOCYTOSIS: Status: RESOLVED | Noted: 2020-10-28 | Resolved: 2020-10-30

## 2020-10-30 PROBLEM — R55 SYNCOPE: Status: RESOLVED | Noted: 2020-10-28 | Resolved: 2020-10-30

## 2020-10-30 PROBLEM — R55 SYNCOPE AND COLLAPSE: Status: RESOLVED | Noted: 2020-10-28 | Resolved: 2020-10-30

## 2020-10-30 PROCEDURE — 99214 OFFICE O/P EST MOD 30 MIN: CPT | Performed by: PSYCHIATRY & NEUROLOGY

## 2020-10-30 PROCEDURE — 97161 PT EVAL LOW COMPLEX 20 MIN: CPT

## 2020-10-30 PROCEDURE — G0378 HOSPITAL OBSERVATION PER HR: HCPCS

## 2020-10-30 PROCEDURE — 25010000002 ENOXAPARIN PER 10 MG: Performed by: NURSE PRACTITIONER

## 2020-10-30 PROCEDURE — 25010000002 CEFTRIAXONE PER 250 MG: Performed by: FAMILY MEDICINE

## 2020-10-30 PROCEDURE — 96372 THER/PROPH/DIAG INJ SC/IM: CPT

## 2020-10-30 PROCEDURE — 99217 PR OBSERVATION CARE DISCHARGE MANAGEMENT: CPT | Performed by: FAMILY MEDICINE

## 2020-10-30 RX ORDER — LISINOPRIL 10 MG/1
10 TABLET ORAL DAILY
Qty: 30 TABLET | Refills: 0 | Status: SHIPPED | OUTPATIENT
Start: 2020-10-30

## 2020-10-30 RX ORDER — CEFUROXIME AXETIL 500 MG/1
500 TABLET ORAL EVERY 12 HOURS SCHEDULED
Qty: 10 TABLET | Refills: 0 | Status: SHIPPED | OUTPATIENT
Start: 2020-10-30 | End: 2020-11-04

## 2020-10-30 RX ORDER — CEFUROXIME AXETIL 250 MG/1
500 TABLET ORAL EVERY 12 HOURS SCHEDULED
Status: DISCONTINUED | OUTPATIENT
Start: 2020-10-30 | End: 2020-10-30 | Stop reason: HOSPADM

## 2020-10-30 RX ORDER — FLUDROCORTISONE ACETATE 0.1 MG/1
0.1 TABLET ORAL DAILY
Qty: 30 TABLET | Refills: 0 | Status: SHIPPED | OUTPATIENT
Start: 2020-10-30

## 2020-10-30 RX ORDER — AMLODIPINE BESYLATE 5 MG/1
5 TABLET ORAL DAILY
Qty: 30 TABLET | Refills: 0 | Status: SHIPPED | OUTPATIENT
Start: 2020-10-30

## 2020-10-30 RX ADMIN — ENOXAPARIN SODIUM 40 MG: 40 INJECTION SUBCUTANEOUS at 08:12

## 2020-10-30 RX ADMIN — FLUDROCORTISONE ACETATE 0.1 MG: 0.1 TABLET ORAL at 08:17

## 2020-10-30 RX ADMIN — SODIUM CHLORIDE, PRESERVATIVE FREE 10 ML: 5 INJECTION INTRAVENOUS at 08:15

## 2020-10-30 RX ADMIN — CARBIDOPA AND LEVODOPA 1 TABLET: 25; 100 TABLET ORAL at 08:12

## 2020-10-30 RX ADMIN — AMLODIPINE BESYLATE 10 MG: 10 TABLET ORAL at 08:12

## 2020-10-30 RX ADMIN — LISINOPRIL 20 MG: 20 TABLET ORAL at 08:12

## 2020-10-30 RX ADMIN — DOCUSATE SODIUM 100 MG: 100 CAPSULE ORAL at 08:12

## 2020-11-02 LAB — BACTERIA SPEC AEROBE CULT: ABNORMAL

## 2022-03-02 ENCOUNTER — HOSPITAL ENCOUNTER (OUTPATIENT)
Facility: HOSPITAL | Age: 82
Setting detail: OBSERVATION
LOS: 1 days | Discharge: HOME | End: 2022-03-04
Attending: EMERGENCY MEDICINE | Admitting: INTERNAL MEDICINE

## 2022-03-02 ENCOUNTER — APPOINTMENT (OUTPATIENT)
Dept: CT IMAGING | Facility: HOSPITAL | Age: 82
End: 2022-03-02

## 2022-03-02 DIAGNOSIS — N39.0 ACUTE UTI: ICD-10-CM

## 2022-03-02 DIAGNOSIS — G20 PARKINSON DISEASE: ICD-10-CM

## 2022-03-02 DIAGNOSIS — R13.10 DYSPHAGIA, UNSPECIFIED TYPE: ICD-10-CM

## 2022-03-02 DIAGNOSIS — G20 PARKINSON'S DISEASE: Chronic | ICD-10-CM

## 2022-03-02 DIAGNOSIS — R55 SYNCOPE, UNSPECIFIED SYNCOPE TYPE: Primary | ICD-10-CM

## 2022-03-02 LAB
ALBUMIN SERPL-MCNC: 3.9 G/DL (ref 3.5–5.2)
ALBUMIN/GLOB SERPL: 1.1 G/DL
ALP SERPL-CCNC: 103 U/L (ref 39–117)
ALT SERPL W P-5'-P-CCNC: <5 U/L (ref 1–41)
AMORPH URATE CRY URNS QL MICRO: ABNORMAL /HPF
ANION GAP SERPL CALCULATED.3IONS-SCNC: 12 MMOL/L (ref 5–15)
AST SERPL-CCNC: 12 U/L (ref 1–40)
BACTERIA UR QL AUTO: ABNORMAL /HPF
BASOPHILS # BLD AUTO: 0.04 10*3/MM3 (ref 0–0.2)
BASOPHILS NFR BLD AUTO: 0.4 % (ref 0–1.5)
BILIRUB SERPL-MCNC: 0.4 MG/DL (ref 0–1.2)
BILIRUB UR QL STRIP: NEGATIVE
BUN SERPL-MCNC: 25 MG/DL (ref 8–23)
BUN/CREAT SERPL: 29.8 (ref 7–25)
CALCIUM SPEC-SCNC: 9.4 MG/DL (ref 8.6–10.5)
CHLORIDE SERPL-SCNC: 95 MMOL/L (ref 98–107)
CLARITY UR: ABNORMAL
CO2 SERPL-SCNC: 26 MMOL/L (ref 22–29)
COLOR UR: YELLOW
CREAT SERPL-MCNC: 0.84 MG/DL (ref 0.76–1.27)
DEPRECATED RDW RBC AUTO: 44.7 FL (ref 37–54)
EGFRCR SERPLBLD CKD-EPI 2021: 88.7 ML/MIN/1.73
EOSINOPHIL # BLD AUTO: 0.11 10*3/MM3 (ref 0–0.4)
EOSINOPHIL NFR BLD AUTO: 1.1 % (ref 0.3–6.2)
ERYTHROCYTE [DISTWIDTH] IN BLOOD BY AUTOMATED COUNT: 12.5 % (ref 12.3–15.4)
FLUAV SUBTYP SPEC NAA+PROBE: NOT DETECTED
FLUBV RNA ISLT QL NAA+PROBE: NOT DETECTED
GLOBULIN UR ELPH-MCNC: 3.5 GM/DL
GLUCOSE SERPL-MCNC: 114 MG/DL (ref 65–99)
GLUCOSE UR STRIP-MCNC: NEGATIVE MG/DL
HCT VFR BLD AUTO: 44.3 % (ref 37.5–51)
HGB BLD-MCNC: 14.7 G/DL (ref 13–17.7)
HGB UR QL STRIP.AUTO: NEGATIVE
HOLD SPECIMEN: NORMAL
HYALINE CASTS UR QL AUTO: ABNORMAL /LPF
IMM GRANULOCYTES # BLD AUTO: 0.04 10*3/MM3 (ref 0–0.05)
IMM GRANULOCYTES NFR BLD AUTO: 0.4 % (ref 0–0.5)
KETONES UR QL STRIP: ABNORMAL
LEUKOCYTE ESTERASE UR QL STRIP.AUTO: ABNORMAL
LYMPHOCYTES # BLD AUTO: 1.07 10*3/MM3 (ref 0.7–3.1)
LYMPHOCYTES NFR BLD AUTO: 10.2 % (ref 19.6–45.3)
MAGNESIUM SERPL-MCNC: 2.4 MG/DL (ref 1.6–2.4)
MCH RBC QN AUTO: 32.6 PG (ref 26.6–33)
MCHC RBC AUTO-ENTMCNC: 33.2 G/DL (ref 31.5–35.7)
MCV RBC AUTO: 98.2 FL (ref 79–97)
MONOCYTES # BLD AUTO: 0.68 10*3/MM3 (ref 0.1–0.9)
MONOCYTES NFR BLD AUTO: 6.5 % (ref 5–12)
NEUTROPHILS NFR BLD AUTO: 8.53 10*3/MM3 (ref 1.7–7)
NEUTROPHILS NFR BLD AUTO: 81.4 % (ref 42.7–76)
NITRITE UR QL STRIP: NEGATIVE
NRBC BLD AUTO-RTO: 0 /100 WBC (ref 0–0.2)
PH UR STRIP.AUTO: <=5 [PH] (ref 5–8)
PLATELET # BLD AUTO: 232 10*3/MM3 (ref 140–450)
PMV BLD AUTO: 10.1 FL (ref 6–12)
POTASSIUM SERPL-SCNC: 4 MMOL/L (ref 3.5–5.2)
PROT SERPL-MCNC: 7.4 G/DL (ref 6–8.5)
PROT UR QL STRIP: ABNORMAL
RBC # BLD AUTO: 4.51 10*6/MM3 (ref 4.14–5.8)
RBC # UR STRIP: ABNORMAL /HPF
REF LAB TEST METHOD: ABNORMAL
SARS-COV-2 RNA PNL SPEC NAA+PROBE: NOT DETECTED
SODIUM SERPL-SCNC: 133 MMOL/L (ref 136–145)
SP GR UR STRIP: 1.02 (ref 1–1.03)
SQUAMOUS #/AREA URNS HPF: ABNORMAL /HPF
TROPONIN T SERPL-MCNC: <0.01 NG/ML (ref 0–0.03)
UROBILINOGEN UR QL STRIP: ABNORMAL
WBC # UR STRIP: ABNORMAL /HPF
WBC NRBC COR # BLD: 10.47 10*3/MM3 (ref 3.4–10.8)
WHOLE BLOOD HOLD SPECIMEN: NORMAL
WHOLE BLOOD HOLD SPECIMEN: NORMAL

## 2022-03-02 PROCEDURE — 93005 ELECTROCARDIOGRAM TRACING: CPT

## 2022-03-02 PROCEDURE — 87086 URINE CULTURE/COLONY COUNT: CPT | Performed by: NURSE PRACTITIONER

## 2022-03-02 PROCEDURE — 25010000002 CEFTRIAXONE PER 250 MG: Performed by: NURSE PRACTITIONER

## 2022-03-02 PROCEDURE — 99218 PR INITIAL OBSERVATION CARE/DAY 30 MINUTES: CPT | Performed by: INTERNAL MEDICINE

## 2022-03-02 PROCEDURE — 83735 ASSAY OF MAGNESIUM: CPT | Performed by: EMERGENCY MEDICINE

## 2022-03-02 PROCEDURE — 80053 COMPREHEN METABOLIC PANEL: CPT | Performed by: EMERGENCY MEDICINE

## 2022-03-02 PROCEDURE — 84484 ASSAY OF TROPONIN QUANT: CPT | Performed by: EMERGENCY MEDICINE

## 2022-03-02 PROCEDURE — 96375 TX/PRO/DX INJ NEW DRUG ADDON: CPT

## 2022-03-02 PROCEDURE — 99284 EMERGENCY DEPT VISIT MOD MDM: CPT

## 2022-03-02 PROCEDURE — 87636 SARSCOV2 & INF A&B AMP PRB: CPT | Performed by: INTERNAL MEDICINE

## 2022-03-02 PROCEDURE — 93005 ELECTROCARDIOGRAM TRACING: CPT | Performed by: EMERGENCY MEDICINE

## 2022-03-02 PROCEDURE — 96361 HYDRATE IV INFUSION ADD-ON: CPT

## 2022-03-02 PROCEDURE — 85025 COMPLETE CBC W/AUTO DIFF WBC: CPT | Performed by: EMERGENCY MEDICINE

## 2022-03-02 PROCEDURE — 81001 URINALYSIS AUTO W/SCOPE: CPT | Performed by: NURSE PRACTITIONER

## 2022-03-02 PROCEDURE — 96365 THER/PROPH/DIAG IV INF INIT: CPT

## 2022-03-02 PROCEDURE — 70450 CT HEAD/BRAIN W/O DYE: CPT

## 2022-03-02 RX ORDER — SODIUM CHLORIDE 9 MG/ML
100 INJECTION, SOLUTION INTRAVENOUS CONTINUOUS
Status: ACTIVE | OUTPATIENT
Start: 2022-03-02 | End: 2022-03-03

## 2022-03-02 RX ORDER — ESCITALOPRAM OXALATE 20 MG/1
10 TABLET ORAL
COMMUNITY

## 2022-03-02 RX ORDER — CARBIDOPA AND LEVODOPA 50; 200 MG/1; MG/1
1 TABLET, EXTENDED RELEASE ORAL EVERY 8 HOURS SCHEDULED
Status: DISCONTINUED | OUTPATIENT
Start: 2022-03-03 | End: 2022-03-03

## 2022-03-02 RX ORDER — LABETALOL HYDROCHLORIDE 5 MG/ML
10 INJECTION, SOLUTION INTRAVENOUS ONCE
Status: COMPLETED | OUTPATIENT
Start: 2022-03-02 | End: 2022-03-02

## 2022-03-02 RX ORDER — CLONAZEPAM 0.5 MG/1
0.5 TABLET ORAL 2 TIMES DAILY PRN
Status: DISCONTINUED | OUTPATIENT
Start: 2022-03-02 | End: 2022-03-03

## 2022-03-02 RX ORDER — ESCITALOPRAM OXALATE 20 MG/1
20 TABLET ORAL NIGHTLY
Status: DISCONTINUED | OUTPATIENT
Start: 2022-03-02 | End: 2022-03-04 | Stop reason: HOSPADM

## 2022-03-02 RX ORDER — SODIUM CHLORIDE 0.9 % (FLUSH) 0.9 %
10 SYRINGE (ML) INJECTION AS NEEDED
Status: DISCONTINUED | OUTPATIENT
Start: 2022-03-02 | End: 2022-03-04 | Stop reason: HOSPADM

## 2022-03-02 RX ADMIN — ESCITALOPRAM OXALATE 20 MG: 20 TABLET ORAL at 22:27

## 2022-03-02 RX ADMIN — SODIUM CHLORIDE 100 ML/HR: 9 INJECTION, SOLUTION INTRAVENOUS at 22:27

## 2022-03-02 RX ADMIN — SODIUM CHLORIDE 1 G: 900 INJECTION INTRAVENOUS at 18:53

## 2022-03-02 RX ADMIN — LABETALOL 20 MG/4 ML (5 MG/ML) INTRAVENOUS SYRINGE 10 MG: at 16:27

## 2022-03-02 NOTE — ED PROVIDER NOTES
Subjective   Syncope yesterday x 30 minutes. No trauma from the syncope but slumped over. Wife witnessed. Seen pcp today and sent to ed. Hx of parkinsons managed by  neuro. Denies cp, soa or fever. No sz activity or hx of sz. bp high today. Not on bp meds. Wife reports on meds to raise bp.      Syncope  Episode history: once yesterday while walking.  Most recent episode:  Today  Duration:  30 minutes  Timing:  Constant  Progression:  Unchanged  Chronicity:  New  Witnessed: yes    Relieved by:  Nothing  Worsened by:  Nothing  Associated symptoms: no chest pain, no diaphoresis, no dizziness, no fever, no headaches, no nausea, no shortness of breath and no vomiting        Review of Systems   Constitutional: Negative for chills, diaphoresis and fever.   HENT: Negative for congestion and sore throat.    Respiratory: Negative for cough, choking, chest tightness, shortness of breath and wheezing.    Cardiovascular: Positive for syncope. Negative for chest pain and leg swelling.   Gastrointestinal: Negative for abdominal distention, abdominal pain, anal bleeding, blood in stool, constipation, diarrhea, nausea and vomiting.   Genitourinary: Negative for difficulty urinating, dysuria, flank pain, frequency, hematuria and urgency.   Neurological: Negative for dizziness and headaches.   All other systems reviewed and are negative.      Past Medical History:   Diagnosis Date   • Hypotension    • Parkinson's disease (CMS/[facility])    • Parkinson's disease (CMS/HCC) 10/28/2020       No Known Allergies    No past surgical history on file.    No family history on file.    Social History     Socioeconomic History   • Marital status:    Tobacco Use   • Smoking status: Never Smoker   • Smokeless tobacco: Never Used   Vaping Use   • Vaping Use: Never used   Substance and Sexual Activity   • Alcohol use: Yes     Comment: occasionally   • Drug use: No   • Sexual activity: Defer           Objective   Physical Exam  Constitutional:        Appearance: He is well-developed.   HENT:      Head: Normocephalic and atraumatic.      Right Ear: External ear normal.      Left Ear: External ear normal.      Nose: Nose normal.   Eyes:      Conjunctiva/sclera: Conjunctivae normal.      Pupils: Pupils are equal, round, and reactive to light.   Cardiovascular:      Rate and Rhythm: Normal rate and regular rhythm.      Heart sounds: Normal heart sounds.   Pulmonary:      Effort: Pulmonary effort is normal.      Breath sounds: Normal breath sounds.   Abdominal:      General: Bowel sounds are normal.      Palpations: Abdomen is soft.   Musculoskeletal:         General: Normal range of motion.      Cervical back: Normal range of motion and neck supple.   Skin:     General: Skin is warm and dry.   Neurological:      Mental Status: He is alert and oriented to person, place, and time.   Psychiatric:         Behavior: Behavior normal.         Judgment: Judgment normal.         Procedures           ED Course           CT Head Without Contrast   Final Result       1. No acute intracranial abnormality.       This report was finalized on 3/2/2022 4:57 PM by Tyrone Arroyo MD.                                                  [facility]    Final diagnoses:   Syncope, unspecified syncope type   Acute UTI   Parkinson disease (HCC)       ED Disposition  ED Disposition     ED Disposition Condition Comment    Decision to Admit            No follow-up provider specified.       Medication List      No changes were made to your prescriptions during this visit.          Steve Donis, MOE  03/02/22 5308

## 2022-03-03 PROBLEM — R55 SYNCOPE: Status: ACTIVE | Noted: 2022-03-03

## 2022-03-03 LAB
ALBUMIN SERPL-MCNC: 3.5 G/DL (ref 3.5–5.2)
ALBUMIN/GLOB SERPL: 1.1 G/DL
ALP SERPL-CCNC: 92 U/L (ref 39–117)
ALT SERPL W P-5'-P-CCNC: <5 U/L (ref 1–41)
ANION GAP SERPL CALCULATED.3IONS-SCNC: 9 MMOL/L (ref 5–15)
AST SERPL-CCNC: 10 U/L (ref 1–40)
BACTERIA SPEC AEROBE CULT: ABNORMAL
BASOPHILS # BLD AUTO: 0.04 10*3/MM3 (ref 0–0.2)
BASOPHILS NFR BLD AUTO: 0.5 % (ref 0–1.5)
BILIRUB SERPL-MCNC: 0.4 MG/DL (ref 0–1.2)
BUN SERPL-MCNC: 20 MG/DL (ref 8–23)
BUN/CREAT SERPL: 30.3 (ref 7–25)
CALCIUM SPEC-SCNC: 8.8 MG/DL (ref 8.6–10.5)
CHLORIDE SERPL-SCNC: 94 MMOL/L (ref 98–107)
CK SERPL-CCNC: 60 U/L (ref 20–200)
CO2 SERPL-SCNC: 27 MMOL/L (ref 22–29)
CREAT SERPL-MCNC: 0.66 MG/DL (ref 0.76–1.27)
DEPRECATED RDW RBC AUTO: 45.8 FL (ref 37–54)
EGFRCR SERPLBLD CKD-EPI 2021: 95.4 ML/MIN/1.73
EOSINOPHIL # BLD AUTO: 0.17 10*3/MM3 (ref 0–0.4)
EOSINOPHIL NFR BLD AUTO: 2 % (ref 0.3–6.2)
ERYTHROCYTE [DISTWIDTH] IN BLOOD BY AUTOMATED COUNT: 12.2 % (ref 12.3–15.4)
GLOBULIN UR ELPH-MCNC: 3.3 GM/DL
GLUCOSE SERPL-MCNC: 90 MG/DL (ref 65–99)
HCT VFR BLD AUTO: 39.9 % (ref 37.5–51)
HGB BLD-MCNC: 13.1 G/DL (ref 13–17.7)
IMM GRANULOCYTES # BLD AUTO: 0.02 10*3/MM3 (ref 0–0.05)
IMM GRANULOCYTES NFR BLD AUTO: 0.2 % (ref 0–0.5)
LYMPHOCYTES # BLD AUTO: 1.13 10*3/MM3 (ref 0.7–3.1)
LYMPHOCYTES NFR BLD AUTO: 13.2 % (ref 19.6–45.3)
MCH RBC QN AUTO: 32.9 PG (ref 26.6–33)
MCHC RBC AUTO-ENTMCNC: 32.8 G/DL (ref 31.5–35.7)
MCV RBC AUTO: 100.3 FL (ref 79–97)
MONOCYTES # BLD AUTO: 0.69 10*3/MM3 (ref 0.1–0.9)
MONOCYTES NFR BLD AUTO: 8.1 % (ref 5–12)
NEUTROPHILS NFR BLD AUTO: 6.51 10*3/MM3 (ref 1.7–7)
NEUTROPHILS NFR BLD AUTO: 76 % (ref 42.7–76)
NRBC BLD AUTO-RTO: 0 /100 WBC (ref 0–0.2)
PLATELET # BLD AUTO: 207 10*3/MM3 (ref 140–450)
PMV BLD AUTO: 10.5 FL (ref 6–12)
POTASSIUM SERPL-SCNC: 3.9 MMOL/L (ref 3.5–5.2)
PROT SERPL-MCNC: 6.8 G/DL (ref 6–8.5)
RBC # BLD AUTO: 3.98 10*6/MM3 (ref 4.14–5.8)
SODIUM SERPL-SCNC: 130 MMOL/L (ref 136–145)
TROPONIN T SERPL-MCNC: <0.01 NG/ML (ref 0–0.03)
WBC NRBC COR # BLD: 8.56 10*3/MM3 (ref 3.4–10.8)

## 2022-03-03 PROCEDURE — 96361 HYDRATE IV INFUSION ADD-ON: CPT

## 2022-03-03 PROCEDURE — 25010000002 CEFTRIAXONE PER 250 MG: Performed by: INTERNAL MEDICINE

## 2022-03-03 PROCEDURE — 97110 THERAPEUTIC EXERCISES: CPT

## 2022-03-03 PROCEDURE — 92610 EVALUATE SWALLOWING FUNCTION: CPT

## 2022-03-03 PROCEDURE — 97162 PT EVAL MOD COMPLEX 30 MIN: CPT

## 2022-03-03 PROCEDURE — 96372 THER/PROPH/DIAG INJ SC/IM: CPT

## 2022-03-03 PROCEDURE — 84484 ASSAY OF TROPONIN QUANT: CPT | Performed by: INTERNAL MEDICINE

## 2022-03-03 PROCEDURE — 80053 COMPREHEN METABOLIC PANEL: CPT | Performed by: INTERNAL MEDICINE

## 2022-03-03 PROCEDURE — 99225 PR SBSQ OBSERVATION CARE/DAY 25 MINUTES: CPT | Performed by: INTERNAL MEDICINE

## 2022-03-03 PROCEDURE — G0378 HOSPITAL OBSERVATION PER HR: HCPCS

## 2022-03-03 PROCEDURE — 82550 ASSAY OF CK (CPK): CPT | Performed by: INTERNAL MEDICINE

## 2022-03-03 PROCEDURE — 96366 THER/PROPH/DIAG IV INF ADDON: CPT

## 2022-03-03 PROCEDURE — 25010000002 HEPARIN (PORCINE) PER 1000 UNITS: Performed by: INTERNAL MEDICINE

## 2022-03-03 PROCEDURE — 85025 COMPLETE CBC W/AUTO DIFF WBC: CPT | Performed by: INTERNAL MEDICINE

## 2022-03-03 RX ORDER — FLUDROCORTISONE ACETATE 0.1 MG/1
100 TABLET ORAL DAILY
Status: DISCONTINUED | OUTPATIENT
Start: 2022-03-03 | End: 2022-03-03

## 2022-03-03 RX ORDER — LISINOPRIL 10 MG/1
10 TABLET ORAL
Status: DISCONTINUED | OUTPATIENT
Start: 2022-03-03 | End: 2022-03-04

## 2022-03-03 RX ORDER — CLONAZEPAM 0.5 MG/1
0.5 TABLET ORAL 2 TIMES DAILY
Status: DISCONTINUED | OUTPATIENT
Start: 2022-03-03 | End: 2022-03-04 | Stop reason: HOSPADM

## 2022-03-03 RX ORDER — CARBIDOPA AND LEVODOPA 50; 200 MG/1; MG/1
2 TABLET, EXTENDED RELEASE ORAL EVERY 8 HOURS SCHEDULED
Status: DISCONTINUED | OUTPATIENT
Start: 2022-03-03 | End: 2022-03-04 | Stop reason: HOSPADM

## 2022-03-03 RX ORDER — FLUDROCORTISONE ACETATE 0.1 MG/1
0.1 TABLET ORAL DAILY
Status: DISCONTINUED | OUTPATIENT
Start: 2022-03-03 | End: 2022-03-04

## 2022-03-03 RX ORDER — AMLODIPINE BESYLATE 5 MG/1
5 TABLET ORAL
Status: DISCONTINUED | OUTPATIENT
Start: 2022-03-03 | End: 2022-03-04 | Stop reason: HOSPADM

## 2022-03-03 RX ORDER — HEPARIN SODIUM 5000 [USP'U]/ML
5000 INJECTION, SOLUTION INTRAVENOUS; SUBCUTANEOUS EVERY 12 HOURS SCHEDULED
Status: DISCONTINUED | OUTPATIENT
Start: 2022-03-03 | End: 2022-03-04 | Stop reason: HOSPADM

## 2022-03-03 RX ORDER — HYDRALAZINE HYDROCHLORIDE 25 MG/1
25 TABLET, FILM COATED ORAL ONCE
Status: COMPLETED | OUTPATIENT
Start: 2022-03-03 | End: 2022-03-03

## 2022-03-03 RX ADMIN — HYDRALAZINE HYDROCHLORIDE 25 MG: 25 TABLET, FILM COATED ORAL at 04:43

## 2022-03-03 RX ADMIN — HEPARIN SODIUM 5000 UNITS: 5000 INJECTION, SOLUTION INTRAVENOUS; SUBCUTANEOUS at 22:12

## 2022-03-03 RX ADMIN — LISINOPRIL 10 MG: 10 TABLET ORAL at 23:19

## 2022-03-03 RX ADMIN — CLONAZEPAM 0.5 MG: 0.5 TABLET ORAL at 22:12

## 2022-03-03 RX ADMIN — CARBIDOPA AND LEVODOPA 1 TABLET: 50; 200 TABLET, EXTENDED RELEASE ORAL at 06:13

## 2022-03-03 RX ADMIN — HEPARIN SODIUM 5000 UNITS: 5000 INJECTION, SOLUTION INTRAVENOUS; SUBCUTANEOUS at 09:55

## 2022-03-03 RX ADMIN — CARBIDOPA AND LEVODOPA 1 TABLET: 25; 100 TABLET ORAL at 09:54

## 2022-03-03 RX ADMIN — CARBIDOPA AND LEVODOPA 1 TABLET: 25; 100 TABLET ORAL at 13:44

## 2022-03-03 RX ADMIN — AMLODIPINE BESYLATE 5 MG: 5 TABLET ORAL at 23:19

## 2022-03-03 RX ADMIN — ESCITALOPRAM OXALATE 20 MG: 20 TABLET ORAL at 22:12

## 2022-03-03 RX ADMIN — SODIUM CHLORIDE 100 ML/HR: 9 INJECTION, SOLUTION INTRAVENOUS at 06:20

## 2022-03-03 RX ADMIN — CLONAZEPAM 0.5 MG: 0.5 TABLET ORAL at 13:44

## 2022-03-03 RX ADMIN — CARBIDOPA AND LEVODOPA 2 TABLET: 50; 200 TABLET, EXTENDED RELEASE ORAL at 15:59

## 2022-03-03 RX ADMIN — FLUDROCORTISONE ACETATE 0.1 MG: 0.1 TABLET ORAL at 13:44

## 2022-03-03 RX ADMIN — SODIUM CHLORIDE 1 G: 900 INJECTION INTRAVENOUS at 13:45

## 2022-03-03 NOTE — CASE MANAGEMENT/SOCIAL WORK
Discharge Planning Assessment  [facility]     Patient Name: Nigel Carmona  MRN: [id number]  Today's Date: 3/3/2022    Admit Date: 3/2/2022     Discharge Needs Assessment     Row Name 03/03/22 1552       Living Environment    Lives With spouse    Name(s) of Who Lives With Patient Carolina de león    Current Living Arrangements home/apartment/condo    Primary Care Provided by spouse/significant other    Provides Primary Care For no one, unable/limited ability to care for self    Family Caregiver if Needed spouse    Family Caregiver Names Carolina de león    Quality of Family Relationships helpful; involved; supportive    Able to Return to Prior Arrangements yes       Transition Planning    Patient/Family Anticipates Transition to home with family    Transportation Anticipated family or friend will provide       Discharge Needs Assessment    Readmission Within the Last 30 Days no previous admission in last 30 days    Equipment Currently Used at Home walker, standard    Concerns to be Addressed discharge planning    Equipment Needed After Discharge rollator    Outpatient/Agency/Support Group Needs homecare agency    Discharge Facility/Level of Care Needs home with home health    Current Discharge Risk chronically ill; dependent with mobility/activities of daily living; physical impairment               Discharge Plan     Row Name 03/03/22 0265       Plan    Plan Home with Home Health    Patient/Family in Agreement with Plan yes    Plan Comments I have met with Mr. Carmona and his wife Carolina today at the bedside to initiate the discharge plan.  He  states that he lives with his wife in their home in [facility].  Carolina reports that he needs assistance with toileting, bathing and dressing at times. She also states that their daughter lives close by and assists with his care.  Mr. Carmona currently uses a standard walker to assist with mobility.  He denies current receipt of home health/outpatient  services.  I have discussed PT/OT recommendations for inpatient rehab however they both decline these services at this time.  Carolina states that she is in the process of hiring a caregiver for the evenings.  I have provided a sitter list and also given her contact info for Chantelle Pina with [facility].  CM will cont to follow the plan of care and assist with discharge planning as recommendations become available.    Final Discharge Disposition Code 06 - home with home health care              Continued Care and Services - Admitted Since 3/2/2022     Durable Medical Equipment     Service Provider Request Status Selected Services Address Phone Fax Patient Preferred    [facility] [place]  Pending - No Request Sent N/A [address], [place] [id number] [phone] --                 Demographic Summary     Row Name 03/03/22 1550       General Information    General Information Comments I have confirmed with Mr. Carmona his PCP is Keegan Baxter MD and his insurance is [employer].               Functional Status     Row Name 03/03/22 1555       Functional Status, IADL    Medications assistive person    Meal Preparation assistive person    Housekeeping assistive person    Laundry assistive person    Shopping assistive person               Psychosocial    No documentation.                Abuse/Neglect    No documentation.                Legal    No documentation.                Substance Abuse    No documentation.                Patient Forms    No documentation.                   Dede Judd RN

## 2022-03-03 NOTE — H&P
[facility] Medicine Services  HISTORY AND PHYSICAL    Patient Name: Nigel Carmona  : 1940  MRN: [id number]  Primary Care Physician: Keegan Baxter MD    Subjective   Subjective     Chief Complaint:syncope    HPI:  Nigel Carmona is a 81 y.o. male with HO parkinsons disease, orthosattic hypotension, who has been doing fairly well. Yesterday he had a syncopal episode- that last 30 minutes per his wife. He thinks he just took a nap. Today his PCP recommended him come to the hospital for evaluation. In the ED patient has very hypertensive. But otherwise without complaint. He denies fever or chills, may have had some dysuria.      Review of Systems   Patient denies  headaches, changes in vision, fever, chills, sore throat, shortness of breath, cough, nausea or vomiting, diarrhea, abdominal pain or distension, change in urine output or habits, joint pain, rash, itching or bleeding.  He does reports ongoing weight loss due to energy that shaking requires  Otherwise complete ROS is negative except as mentioned in the HPI.    Personal History     Past Medical History:   Diagnosis Date   • Hypotension    • Parkinson's disease (HCC) 10/28/2020       History reviewed. No pertinent surgical history.    Family History: family history is not on file.     Social History:  reports that he has never smoked. He has never used smokeless tobacco. He reports current alcohol use. He reports that he does not use drugs.    Medications:  Carbidopa-Levodopa, Carbidopa-Levodopa ER, amLODIPine, clonazePAM, escitalopram, fludrocortisone, and lisinopril      No Known Allergies    Objective   Objective     Vital Signs:   Temp:  [97.5 °F (36.4 °C)-98 °F (36.7 °C)] 98 °F (36.7 °C)  Heart Rate:  [68-88] 71  Resp:  [16-18] 16  BP: (111-193)/() 176/114    Patient is alert and talkative, dry, thin, scaphoid  Neck is without mass or JVD  Heart is Reg wo murmur  Lungs are clear wo wheeze or crackle  Abd is  soft without HSM or mass, not tender or distended  MAEW, muscle wasting  Skin is without rash  Neurologic exam is nonfocal , resting tremor  Mood is appropriate- clear mentation      Result Review:  I have personally reviewed the results from the time of this admission   to 3/2/2022 20:54 EST and agree with these findings:  [x]  Laboratory  [x]  Microbiology  []  Radiology  [x]  EKG/Telemetry   []  Cardiology/Vascular   []  Pathology  [x]  Old records  []  Other:  Most notable findings include:    LAB RESULTS:      Lab 03/02/22  1603   WBC 10.47   HEMOGLOBIN 14.7   HEMATOCRIT 44.3   PLATELETS 232   NEUTROS ABS 8.53*   IMMATURE GRANS (ABS) 0.04   LYMPHS ABS 1.07   MONOS ABS 0.68   EOS ABS 0.11   MCV 98.2*         Lab 03/02/22  1603   SODIUM 133*   POTASSIUM 4.0   CHLORIDE 95*   CO2 26.0   ANION GAP 12.0   BUN 25*   CREATININE 0.84   EGFR 88.7   GLUCOSE 114*   CALCIUM 9.4   MAGNESIUM 2.4         Lab 03/02/22  1603   TOTAL PROTEIN 7.4   ALBUMIN 3.90   GLOBULIN 3.5   ALT (SGPT) <5   AST (SGOT) 12   BILIRUBIN 0.4   ALK PHOS 103         Lab 03/02/22  1603   TROPONIN T <0.010                 Brief Urine Lab Results  (Last result in the past 365 days)      Color   Clarity   Blood   Leuk Est   Nitrite   Protein   CREAT   Urine HCG        03/02/22 1710 Yellow   Cloudy   Negative   Small (1+)   Negative   Trace               COVID19   Date Value Ref Range Status   03/02/2022 Not Detected Not Detected - Ref. Range Final       CT Head Without Contrast    Result Date: 3/2/2022  CT HEAD WO CONTRAST-  Date of Exam: 3/2/2022 4:40 PM  Indication: syncope. htn..  Comparison Exams: 10/29/2020  Technique: Multiple axial images were obtained from the skull base to the vertex without the administration of IV contrast. The axial data was used to generate reformatted images in the coronal and sagittal planes. Automated exposure control and iterative reconstruction methods were used.  FINDINGS: There is mild generalized parenchymal volume  loss.   There is no acute infarct or hemorrhage.   No abnormal extra-axial fluid collections are seen.   There is no mass effect or hydrocephalus.  There is no evidence of skull fracture.   Visualized paranasal sinuses and mastoid air cells are clear.  The globes and orbits are within normal limits.      Impression:  1. No acute intracranial abnormality.  This report was finalized on 3/2/2022 4:57 PM by Tyrone Arroyo MD.        Results for orders placed during the hospital encounter of 10/28/20    Adult Transthoracic Echo Complete W/ Cont if Necessary Per Protocol    Interpretation Summary  · Calculated left ventricular EF = 63%  · Trace mitral valve regurgitation is present.  · Estimated right ventricular systolic pressure from tricuspid regurgitation is normal (<35 mmHg). Calculated right ventricular systolic pressure from tricuspid regurgitation is 12 mmHg.      Current COVID Risks are:  [] Fever []  Cough [] Shortness of breath [] Change in taste or smell  [] Exposure to COVID patient  [] High risk facility   [x]  NONE  The patient has started, but not completed, their COVID-19 vaccination series.    Assessment/Plan   Assessment / Plan       Parkinson's disease (HCC)    Orthostasis    Elevated blood pressure reading    UTI (urinary tract infection)      Assessment & Plan:    Nigel Carmona is a 81 y.o. male with HO parkinsons disease, orthosattic hypotension, who has been doing fairly well- until syncopal event yesterday    Possible Syncope- vs orthostatic hypotension  -hold fluorinef  -recent echo and carotids were negative    UTI  -rocephin, adjust per culture results    PD  -cont sinemet  -PT eval    Severe malnutrition  -IVFs  -Cont supplements    DVT prophylaxis:  No DVT prophylaxis order currently exists.    CODE STATUS:  Code Status (Patient has no pulse and is not breathing): CPR (Attempt to Resuscitate)  Medical Interventions (Patient has pulse or is breathing): Full      Admission Status: INPATIENT  status .      Electronically signed by Ev Causey MD 03/02/22 20:54 EST

## 2022-03-03 NOTE — NURSING NOTE
[facility] Nursing Consult: Bilateral coccyx, toes    Patient lying in bed working with physical therapy.  Family/support person not present.  Skin assessed and the following noted:    1.Wound Assessment  Wound Type: Stage I pressure injury  Location: Bilateral coccyx  Wound Bed: Pink, intact, nonblanching  Periwound Skin: Excoriated, pink, blanchable  Drainage: None  Odor: None  Pain: None  Image:       Recommendation(s) for management of wound: Cleanse skin daily with pH foam cleanser and barrier wipes.  Apply Z guard to bilateral coccyx daily and as needed with stool incontinence.  Turn patient every 2 hours, use repositioning wedge.    2.Wound Assessment  Wound Type: Pressure wound versus arterial wound  Location: Right second great toe  Measurements: 1 x 1 x 0.1  Wound Bed: Dry scabbed red  Wound Edges: Irregular, dry  Periwound Skin: Dry, pink  Drainage: None  Odor: None  Pain: None  Image:       Recommendation(s) for management of wound: Paint with Betadine twice daily, leave open to air.    All skin interventions in place.  Head to toe assessment completed. Heels and bottom blanchable, intact and offloaded.     Discussed plan of care with  RN.    Thank you for consulting the [facility] Nurse.  WO Team will sign off patient.  Please re consult if wound(s) worsens.     Anson Ngo RN, BSN, CCRN   Wound, Ostomy and Continence (WOC) Department  [facility]

## 2022-03-03 NOTE — THERAPY EVALUATION
Acute Care - Speech Language Pathology   Swallow Initial Evaluation [facility]   Clinical Swallow Evaluation       Patient Name: Nigel Carmona  : 1940  MRN: [id number]  Today's Date: 3/3/2022               Admit Date: 3/2/2022    Visit Dx:     ICD-10-CM ICD-9-CM   1. Syncope, unspecified syncope type  R55 780.2   2. Acute UTI  N39.0 599.0   3. Parkinson disease (HCC)  G20 332.0   4. Dysphagia, unspecified type  R13.10 787.20     Patient Active Problem List   Diagnosis   • Parkinson's disease (HCC)   • Orthostasis   • Elevated blood pressure reading   • Essential hypertension   • UTI (urinary tract infection)     Past Medical History:   Diagnosis Date   • Hypotension    • Parkinson's disease (HCC) 10/28/2020     History reviewed. No pertinent surgical history.    SLP Recommendation and Plan  SLP Swallowing Diagnosis: R/O pharyngeal dysphagia, other (see comments) (appears WFL) (22)  SLP Diet Recommendation: regular textures, thin liquids (22)  Recommended Precautions and Strategies: general aspiration precautions (22)  SLP Rec. for Method of Medication Administration: meds whole, with thin liquids, with pudding or applesauce, as tolerated (22)     Monitor for Signs of Aspiration: yes, notify SLP if any concerns (22)     Swallow Criteria for Skilled Therapeutic Interventions Met: demonstrates skilled criteria (22)  Anticipated Discharge Disposition (SLP): unknown (22)  Rehab Potential/Prognosis, Swallowing: good, to achieve stated therapy goals (22)  Therapy Frequency (Swallow): PRN (22)                                            SWALLOW EVALUATION (last 72 hours)     SLP Adult Swallow Evaluation     Row Name 22                   Rehab Evaluation    Document Type evaluation  -CH        Subjective Information no complaints  -CH        Patient Observations alert; cooperative; agree to therapy  -         Patient/Family/Caregiver Comments/Observations spouse present  -CH        Patient Effort good  -CH        Symptoms Noted During/After Treatment none  -CH                  General Information    Patient Profile Reviewed yes  -CH        Pertinent History Of Current Problem Patient admitted w syncope, UTI. Hx parkinsons, severe malnutrition. CT: no acute findings.  -CH        Current Method of Nutrition regular textures; thin liquids  -CH        Precautions/Limitations, Vision WFL; for purposes of eval  -CH        Precautions/Limitations, Hearing WFL; for purposes of eval  -CH        Prior Level of Function-Communication WFL  -CH        Prior Level of Function-Swallowing no diet consistency restrictions  -        Plans/Goals Discussed with patient; spouse/S.O.; agreed upon  -        Barriers to Rehab none identified  -        Patient's Goals for Discharge patient did not state  -                  Pain    Additional Documentation Pain Scale: Numbers Pre/Post-Treatment (Group)  -                  Pain Scale: Numbers Pre/Post-Treatment    Pretreatment Pain Rating 0/10 - no pain  -CH        Posttreatment Pain Rating 0/10 - no pain  -CH                  Oral Motor Structure and Function    Dentition Assessment natural, present and adequate; missing teeth  -CH        Secretion Management WNL/WFL  -CH        Mucosal Quality dry  -CH        Volitional Swallow WFL  -                  Oral Musculature and Cranial Nerve Assessment    Oral Motor General Assessment WFL  -CH                  General Eating/Swallowing Observations    Eating/Swallowing Skills fed by SLP  -        Positioning During Eating upright 90 degree; upright in bed; needs frequent re-positioning; other (see comments)  able to reposition self.  -        Utensils Used spoon; cup; straw  -        Consistencies Trialed ice chips; thin liquids; pureed; soft textures; regular textures; mixed consistency  -                  Respiratory     Respiratory Status WFL  -CH                  Clinical Swallow Eval    Oral Prep Phase WFL  -CH        Oral Transit WFL  -CH        Oral Residue WFL  -CH        Pharyngeal Phase no overt signs/symptoms of pharyngeal impairment  -        Esophageal Phase unremarkable  -        Clinical Swallow Evaluation Summary Patient seen for clinical swallow evaluation. Trials of thin liquids via ice/spoon/cup/straw, pureed, soft solids, mixed and regular consistency solids presented without s/s of aspiration. Recommend continuation of regular diet and thin liquids. Meds whole with thin liquids or in pureed as tolerated. SLP to f/u for diet tolerance.  -                  SLP Evaluation Clinical Impression    SLP Swallowing Diagnosis R/O pharyngeal dysphagia; other (see comments)  appears WFL  -CH        Functional Impact risk of aspiration/pneumonia  -        Rehab Potential/Prognosis, Swallowing good, to achieve stated therapy goals  -        Swallow Criteria for Skilled Therapeutic Interventions Met demonstrates skilled criteria  -                  Recommendations    Therapy Frequency (Swallow) PRN  -        SLP Diet Recommendation regular textures; thin liquids  -        Recommended Precautions and Strategies general aspiration precautions  -        Oral Care Recommendations Oral Care BID/PRN  -        SLP Rec. for Method of Medication Administration meds whole; with thin liquids; with pudding or applesauce; as tolerated  -        Monitor for Signs of Aspiration yes; notify SLP if any concerns  -        Anticipated Discharge Disposition (SLP) unknown  -              User Key  (r) = Recorded By, (t) = Taken By, (c) = Cosigned By    Initials Name Effective Dates    Jania Kinney, MS CCC-SLP 06/16/21 -                 EDUCATION  The patient has been educated in the following areas:   Dysphagia (Swallowing Impairment) Oral Care/Hydration.        SLP GOALS     Row Name 03/03/22 0930             Oral  Nutrition/Hydration Goal 1 (SLP)    Oral Nutrition/Hydration Goal 1, SLP LTG: pt will tolerate regular diet and thin liquids without s/s of aspiration in 90% of trials  -      Time Frame (Oral Nutrition/Hydration Goal 1, SLP) by discharge  -              Oral Nutrition/Hydration Goal 2 (SLP)    Oral Nutrition/Hydration Goal 2, SLP STG: pt will tolerate regular diet and thin liquids without s/s of aspiration in 90% of trials  -      Time Frame (Oral Nutrition/Hydration Goal 2, SLP) by discharge  -            User Key  (r) = Recorded By, (t) = Taken By, (c) = Cosigned By    Initials Name Provider Type    Jania Kinney MS CCC-SLP Speech and Language Pathologist                   Time Calculation:    Time Calculation- SLP     Row Name 03/03/22 1028             Time Calculation- SLP    SLP Start Time 0930  -CH      SLP Received On 03/03/22  -              Untimed Charges    SLP Eval/Re-eval  ST Eval Oral Pharyng Swallow - 99749  -CH      75023-[id number] Eval Oral Pharyng Swallow Minutes 45  -CH              Total Minutes    Untimed Charges Total Minutes 45  -CH       Total Minutes 45  -CH            User Key  (r) = Recorded By, (t) = Taken By, (c) = Cosigned By    Initials Name Provider Type    Jania Kinney MS CCC-SLP Speech and Language Pathologist                Therapy Charges for Today     Code Description Service Date Service Provider Modifiers Qty    [id number]  ST EVAL ORAL PHARYNG SWALLOW 3 3/3/2022 Jania Hutchison MS CCC-SLP GN 1               Jania Hutchison MS CCC-SLP  3/3/2022     Patient was not wearing a face mask and did not exhibit coughing during this therapy encounter.  Procedure performed was aerosolizing, involved close contact (within 6 feet for at least 15 minutes or longer), and did not involve contact with infectious secretions or specimens.  Therapist used appropriate personal protective equipment including gloves, standard procedure mask and eye protection.   Appropriate PPE was worn during the entire therapy session.  Hand hygiene was completed before and after therapy session.

## 2022-03-03 NOTE — PROGRESS NOTES
[facility] Medicine Services  PROGRESS NOTE    Patient Name: Nigel Carmona  : 1940  MRN: [id number]    Date of Admission: 3/2/2022  Primary Care Physician: Keegan Baxter MD    Subjective   Subjective     CC:  syncope    HPI:  No acute events. States he feels ok. Wife declined therapy today. States he was up walking and passed out for 30 minutes.     ROS:  Gen- No fevers, chills  CV- No chest pain, palpitations  Resp- No cough, dyspnea  GI- No N/V/D, abd pain     Objective   Objective     Vital Signs:   Temp:  [97.1 °F (36.2 °C)-98.7 °F (37.1 °C)] 98.7 °F (37.1 °C)  Heart Rate:  [61-88] 73  Resp:  [16-18] 18  BP: (111-193)/() 128/90     Physical Exam:  Constitutional: No acute distress, awake, alert; chronically ill appearing   HENT: NCAT, mucous membranes moist  Respiratory: Clear to auscultation bilaterally, respiratory effort normal   Cardiovascular: RRR, no murmurs, rubs, or gallops  Gastrointestinal: Positive bowel sounds, soft, nontender, nondistended  Musculoskeletal: No bilateral ankle edema  Psychiatric: Appropriate affect, cooperative  Neurologic: Oriented x 3, resting tremor noted in left lower ext; spastic movements   Skin: No rashes    Results Reviewed:  LAB RESULTS:      Lab 22  0501 22  1603   WBC 8.56 10.47   HEMOGLOBIN 13.1 14.7   HEMATOCRIT 39.9 44.3   PLATELETS 207 232   NEUTROS ABS 6.51 8.53*   IMMATURE GRANS (ABS) 0.02 0.04   LYMPHS ABS 1.13 1.07   MONOS ABS 0.69 0.68   EOS ABS 0.17 0.11   .3* 98.2*         Lab 22  0501 22  1603   SODIUM 130* 133*   POTASSIUM 3.9 4.0   CHLORIDE 94* 95*   CO2 27.0 26.0   ANION GAP 9.0 12.0   BUN 20 25*   CREATININE 0.66* 0.84   EGFR 95.4 88.7   GLUCOSE 90 114*   CALCIUM 8.8 9.4   MAGNESIUM  --  2.4         Lab 22  0501 03/02/22  1603   TOTAL PROTEIN 6.8 7.4   ALBUMIN 3.50 3.90   GLOBULIN 3.3 3.5   ALT (SGPT) <5 <5   AST (SGOT) 10 12   BILIRUBIN 0.4 0.4   ALK PHOS 92 103         Lab  03/03/22  0501 03/02/22  1603   TROPONIN T <0.010 <0.010                 Brief Urine Lab Results  (Last result in the past 365 days)      Color   Clarity   Blood   Leuk Est   Nitrite   Protein   CREAT   Urine HCG        03/02/22 1710 Yellow   Cloudy   Negative   Small (1+)   Negative   Trace                 Microbiology Results Abnormal     Procedure Component Value - Date/Time    COVID PRE-OP / PRE-PROCEDURE SCREENING ORDER (NO ISOLATION) - Swab, Nasopharynx [[id number]]  (Normal) Collected: 03/02/22 1852    Lab Status: Final result Specimen: Swab from Nasopharynx Updated: 03/02/22 1944    Narrative:      The following orders were created for panel order COVID PRE-OP / PRE-PROCEDURE SCREENING ORDER (NO ISOLATION) - Swab, Nasopharynx.  Procedure                               Abnormality         Status                     ---------                               -----------         ------                     COVID-19 and FLU A/B PCR...[[id number]]  Normal              Final result                 Please view results for these tests on the individual orders.    COVID-19 and FLU A/B PCR - Swab, Nasopharynx [[id number]]  (Normal) Collected: 03/02/22 1852    Lab Status: Final result Specimen: Swab from Nasopharynx Updated: 03/02/22 1944     COVID19 Not Detected     Influenza A PCR Not Detected     Influenza B PCR Not Detected    Narrative:      Fact sheet for providers: https://www.fda.gov/media/499248/download    Fact sheet for patients: https://www.fda.gov/media/463991/download    Test performed by PCR.          CT Head Without Contrast    Result Date: 3/2/2022  CT HEAD WO CONTRAST-  Date of Exam: 3/2/2022 4:40 PM  Indication: syncope. htn..  Comparison Exams: 10/29/2020  Technique: Multiple axial images were obtained from the skull base to the vertex without the administration of IV contrast. The axial data was used to generate reformatted images in the coronal and sagittal planes. Automated exposure control and iterative  reconstruction methods were used.  FINDINGS: There is mild generalized parenchymal volume loss.   There is no acute infarct or hemorrhage.   No abnormal extra-axial fluid collections are seen.   There is no mass effect or hydrocephalus.  There is no evidence of skull fracture.   Visualized paranasal sinuses and mastoid air cells are clear.  The globes and orbits are within normal limits.      Impression:  1. No acute intracranial abnormality.  This report was finalized on 3/2/2022 4:57 PM by Tyrone Arroyo MD.        Results for orders placed during the hospital encounter of 10/28/20    Adult Transthoracic Echo Complete W/ Cont if Necessary Per Protocol    Interpretation Summary  · Calculated left ventricular EF = 63%  · Trace mitral valve regurgitation is present.  · Estimated right ventricular systolic pressure from tricuspid regurgitation is normal (<35 mmHg). Calculated right ventricular systolic pressure from tricuspid regurgitation is 12 mmHg.      I have reviewed the medications:  Scheduled Meds:carbidopa-levodopa, 1 tablet, Oral, Q12H  carbidopa-levodopa CR, 1 tablet, Oral, Q8H  cefTRIAXone, 1 g, Intravenous, Q24H  escitalopram, 20 mg, Oral, Nightly  heparin (porcine), 5,000 Units, Subcutaneous, Q12H      Continuous Infusions:   PRN Meds:.clonazePAM  •  nitroglycerin  •  sodium chloride    Assessment/Plan   Assessment & Plan     Active Hospital Problems    Diagnosis  POA   • UTI (urinary tract infection) [N39.0]  Yes   • Parkinson's disease (HCC) [G20]  Yes   • Orthostasis [I95.1]  Yes   • Elevated blood pressure reading [R03.0]  Yes      Resolved Hospital Problems   No resolved problems to display.        Brief Hospital Course to date:  Nigel Carmona is a 81 y.o. male with HO parkinsons disease, orthosattic hypotension, who has been doing fairly well- until syncopal event yesterday     Possible Syncope vs orthostatic hypotension  -Admission in October 2020 for similar symptoms  - Resume fludrocortisone --  hold for blood pressure over 140  - Recent echo and carotids were negative in 2020  - PT/OT - family declined   - Obtain orthostatics     HTN   - Has been off medication      UTI  -rocephin, culture pending      PD  -cont sinemet  - PT/OT      Severe malnutrition  -Cont supplements       DVT prophylaxis:  Medical DVT prophylaxis orders are present.       AM-PAC 6 Clicks Score (PT): 11 (03/02/22 2000)    Disposition: I expect the patient to be discharged likely home tomorrow    CODE STATUS:   Code Status and Medical Interventions:   Ordered at: 03/02/22 2057     Code Status (Patient has no pulse and is not breathing):    CPR (Attempt to Resuscitate)     Medical Interventions (Patient has pulse or is breathing):    Full       Dolly Arboleda, DO  03/03/22

## 2022-03-03 NOTE — THERAPY EVALUATION
Patient Name: Nigel Carmona  : 1940    MRN: [id number]                              Today's Date: 3/3/2022       Admit Date: 3/2/2022    Visit Dx:     ICD-10-CM ICD-9-CM   1. Syncope, unspecified syncope type  R55 780.2   2. Acute UTI  N39.0 599.0   3. Parkinson disease (HCC)  G20 332.0     Patient Active Problem List   Diagnosis   • Parkinson's disease (HCC)   • Orthostasis   • Elevated blood pressure reading   • Essential hypertension   • UTI (urinary tract infection)     Past Medical History:   Diagnosis Date   • Hypotension    • Parkinson's disease (HCC) 10/28/2020     History reviewed. No pertinent surgical history.   General Information     Row Name 22          Physical Therapy Time and Intention    Document Type evaluation  -KG     Mode of Treatment physical therapy  -KG     Row Name 22          General Information    Patient Profile Reviewed yes  -KG     Prior Level of Function --  pt inconsistent historian, fluctuated between reporting walking indep with walker at home to saying he hasn't walked in 1 year, fluctuated with reporting indep with ADLs to wife assist with ADLs  -KG     Existing Precautions/Restrictions fall; other (see comments)  parkinsons but severe movement disorder symptoms, pt with uncontrolled extremity movement, flexion/ extremity crossing tendancies  -KG     Barriers to Rehab cognitive status; previous functional deficit  -KG     Row Name 22          Living Environment    Lives With spouse  -KG     Row Name 22          Home Main Entrance    Number of Stairs, Main Entrance none  -KG     Row Name 22          Stairs Within Home, Primary    Number of Stairs, Within Home, Primary none  -KG     Row Name 22          Cognition    Orientation Status (Cognition) oriented to; person; place  pt reports 2002, some confusion on situation  -KG     Row Name 22          Safety Issues, Functional Mobility    Safety  Issues Affecting Function (Mobility) awareness of need for assistance; insight into deficits/self-awareness; safety precaution awareness; problem-solving; safety precautions follow-through/compliance  -KG     Impairments Affecting Function (Mobility) balance; endurance/activity tolerance; strength; muscle tone abnormal; postural/trunk control  -KG           User Key  (r) = Recorded By, (t) = Taken By, (c) = Cosigned By    Initials Name Provider Type    SALVADOR Doris Good Physical Therapist               Mobility     Row Name 03/03/22 0936          Bed Mobility    Bed Mobility supine-sit-supine  -KG     Supine-Sit-Supine [place] (Bed Mobility) moderate assist (50% patient effort); 1 person assist  -KG     Assistive Device (Bed Mobility) bed rails; head of bed elevated  -KG     Comment (Bed Mobility) mod-A to achieve EOB, pt became dizzy sitting EOB, 2 attempts supine to sit but difficult to get accurate BP due to constant extremity writhing movement,  -KG     Row Name 03/03/22 0936          Transfers    Comment (Transfers) min-A STS from EOB, once standing difficulty maintaining balance due to tendancy to cross/ flex LEs, difficulty maintaining UE support on walker due to writhing movement, became very dizzy on both attempts and needed to sit, BP 91/72  -KG     Row Name 03/03/22 0936          Bed-Chair Transfer    Bed-Chair [place] (Transfers) unable to assess  -KG     Row Name 03/03/22 0936          Sit-Stand Transfer    Sit-Stand [place] (Transfers) minimum assist (75% patient effort); 2 person assist  -KG     Assistive Device (Sit-Stand Transfers) walker, front-wheeled  -KG     Row Name 03/03/22 0936          Gait/Stairs (Locomotion)    [place] Level (Gait) unable to assess  -KG           User Key  (r) = Recorded By, (t) = Taken By, (c) = Cosigned By    Initials Name Provider Type    SALVADOR Doris Good Physical Therapist               Obj/Interventions     Row Name 03/03/22 0900           Strength Comprehensive (MMT)    General Manual Muscle Testing (MMT) Assessment lower extremity strength deficits identified  -KG     Comment, General Manual Muscle Testing (MMT) Assessment difficult to assess due to writhing movement but able to perform SLR  -KG     Row Name 03/03/22 0941          Motor Skills    Motor Skills therapeutic exercise  -KG     Therapeutic Exercise knee; ankle  -KG     Row Name 03/03/22 0941          Knee (Therapeutic Exercise)    Knee (Therapeutic Exercise) strengthening exercise  -KG     Knee Strengthening (Therapeutic Exercise) heel slides; bilateral; SLR (straight leg raise); 10 repetitions  -KG     Row Name 03/03/22 0941          Ankle (Therapeutic Exercise)    Ankle (Therapeutic Exercise) strengthening exercise  -KG     Ankle Strengthening (Therapeutic Exercise) bilateral; dorsiflexion; plantarflexion; 2 sets; 10 repetitions  -KG     Row Name 03/03/22 0941          Balance    Balance Assessment sitting static balance; standing static balance  -KG     Static Sitting Balance mild impairment; supported; sitting, edge of bed  -KG     Static Standing Balance moderate impairment; supported; standing  -KG     Balance Interventions standing; weight shifting activity; sit to stand  -KG           User Key  (r) = Recorded By, (t) = Taken By, (c) = Cosigned By    Initials Name Provider Type    KG Doris Good Physical Therapist               Goals/Plan     Row Name 03/03/22 0950          Bed Mobility Goal 1 (PT)    Activity/Assistive Device (Bed Mobility Goal 1, PT) sit to supine/supine to sit  -KG     [place] Level/Cues Needed (Bed Mobility Goal 1, PT) minimum assist (75% or more patient effort)  -KG     Time Frame (Bed Mobility Goal 1, PT) short term goal (STG); 3 days  -KG     Progress/Outcomes (Bed Mobility Goal 1, PT) continuing progress toward goal  -KG     Row Name 03/03/22 0950          Transfer Goal 1 (PT)    Activity/Assistive Device (Transfer Goal 1, PT)  sit-to-stand/stand-to-sit; toilet  -KG     [place] Level/Cues Needed (Transfer Goal 1, PT) contact guard assist  -KG     Time Frame (Transfer Goal 1, PT) short term goal (STG); 5 days  -KG     Progress/Outcome (Transfer Goal 1, PT) continuing progress toward goal  -KG           User Key  (r) = Recorded By, (t) = Taken By, (c) = Cosigned By    Initials Name Provider Type    SALVADOR Doris Good Physical Therapist               Clinical Impression     Row Name 03/03/22 0943          Pain    Additional Documentation Pain Scale: Numbers Pre/Post-Treatment (Group)  -KG     Row Name 03/03/22 0943          Pain Scale: Numbers Pre/Post-Treatment    Pretreatment Pain Rating 0/10 - no pain  -KG     Posttreatment Pain Rating 0/10 - no pain  -KG     Row Name 03/03/22 0943          Plan of Care Review    Plan of Care Reviewed With patient  -KG     Progress no change  -KG     Outcome Summary PT eval performed: Patient presenting with significant movement disorder symptoms with constant writhing movement and severe flexion/ extremity crossing tendancy.  Pt inconsistent historian but reports he's currently weaker than baseline.   Mod-A to achieve EOB.  min-A to maintain sitting.  min-A STS from EOB, once standing difficulty maintaining balance due to tendancy to cross/ flex LEs, difficulty maintaining UE support on walker due to writhing movement, became very dizzy on both attempts and needed to sit, BP 91/72.  Pt high fall risk and reports frequent falls at home.  Recommended option of rehab to patient and not agreeable.  discussed 2nd option of home with HH and patient also not interested.  -KG     Row Name 03/03/22 0943          Therapy Assessment/Plan (PT)    Rehab Potential (PT) fair, will monitor progress closely  -KG     Criteria for Skilled Interventions Met (PT) yes; meets criteria; skilled treatment is necessary  -KG     Row Name 03/03/22 0943          Vital Signs    Pre Systolic BP Rehab 128  -KG     Pre Treatment  Diastolic BP 90  -KG     Intra Systolic BP Rehab 104  -KG     Intra Treatment Diastolic BP 93  -KG     Post Systolic BP Rehab 91  -KG     Post Treatment Diastolic BP 72  -KG     Row Name 03/03/22 0943          Positioning and Restraints    Pre-Treatment Position in bed  -KG     Post Treatment Position bed  -KG     In Bed notified nsg; fowlers; call light within reach; encouraged to call for assist; exit alarm on; side rails up x3; with SLP  -KG           User Key  (r) = Recorded By, (t) = Taken By, (c) = Cosigned By    Initials Name Provider Type    Doris Yanez Physical Therapist               Outcome Measures     Row Name 03/03/22 0951          How much help from another person do you currently need...    Turning from your back to your side while in flat bed without using bedrails? 3  -KG     Moving from lying on back to sitting on the side of a flat bed without bedrails? 2  -KG     Moving to and from a bed to a chair (including a wheelchair)? 2  -KG     Standing up from a chair using your arms (e.g., wheelchair, bedside chair)? 2  -KG     Climbing 3-5 steps with a railing? 1  -KG     To walk in hospital room? 1  -KG     AM-PAC 6 Clicks Score (PT) 11  -KG     Row Name 03/03/22 0951          Functional Assessment    Outcome Measure Options AM-PAC 6 Clicks Basic Mobility (PT)  -KG           User Key  (r) = Recorded By, (t) = Taken By, (c) = Cosigned By    Initials Name Provider Type    Doris Yanez Physical Therapist                             Physical Therapy Education                 Title: PT OT SLP Therapies (In Progress)     Topic: Physical Therapy (In Progress)     Point: Mobility training (In Progress)     Learning Progress Summary           Patient Acceptance, E, NR by KG at 3/3/2022 0953                   Point: Home exercise program (In Progress)     Learning Progress Summary           Patient Acceptance, E, NR by KG at 3/3/2022 0953                   Point: Body mechanics (In Progress)      Learning Progress Summary           Patient Acceptance, E, NR by KG at 3/3/2022 0953                   Point: Precautions (In Progress)     Learning Progress Summary           Patient Acceptance, E, NR by KG at 3/3/2022 0953                               User Key     Initials Effective Dates Name Provider Type Discipline    KG 06/16/21 -  Doris Good Physical Therapist PT              PT Recommendation and Plan     Plan of Care Reviewed With: patient  Progress: no change  Outcome Summary: PT eval performed: Patient presenting with significant movement disorder symptoms with constant writhing movement and severe flexion/ extremity crossing tendancy.  Pt inconsistent historian but reports he's currently weaker than baseline.   Mod-A to achieve EOB.  min-A to maintain sitting.  min-A STS from EOB, once standing difficulty maintaining balance due to tendancy to cross/ flex LEs, difficulty maintaining UE support on walker due to writhing movement, became very dizzy on both attempts and needed to sit, BP 91/72.  Pt high fall risk and reports frequent falls at home.  Recommended option of rehab to patient and not agreeable.  discussed 2nd option of home with HH and patient also not interested.     Time Calculation:    PT Charges     Row Name 03/03/22 0956             Time Calculation    Start Time 0830  -KG      PT Received On 03/03/22  -KG      PT Goal Re-Cert Due Date 03/13/22  -KG              Time Calculation- PT    Total Timed Code Minutes- PT 10 minute(s)  -KG              Timed Charges    36557 - PT Therapeutic Exercise Minutes 10  -KG              Untimed Charges    PT Eval/Re-eval Minutes 60  -KG              Total Minutes    Timed Charges Total Minutes 10  -KG      Untimed Charges Total Minutes 60  -KG       Total Minutes 70  -KG            User Key  (r) = Recorded By, (t) = Taken By, (c) = Cosigned By    Initials Name Provider Type    KG Doris Good Physical Therapist              Therapy Charges  for Today     Code Description Service Date Service Provider Modifiers Qty    40132583235 HC PT THER PROC EA 15 MIN 3/3/2022 Doris Good GP 1    15908447004 HC PT EVAL MOD COMPLEXITY 4 3/3/2022 Doris Good GP 1          PT G-Codes  Outcome Measure Options: AM-PAC 6 Clicks Basic Mobility (PT)  AM-PAC 6 Clicks Score (PT): 11    Doris Good  3/3/2022

## 2022-03-03 NOTE — PLAN OF CARE
Goal Outcome Evaluation:  Plan of Care Reviewed With: patient        Progress: no change  Outcome Summary: PT eval performed: Patient presenting with significant movement disorder symptoms with constant writhing movement and severe flexion/ extremity crossing tendancy.  Pt inconsistent historian but reports he's currently weaker than baseline.   Mod-A to achieve EOB.  min-A to maintain sitting.  min-A STS from EOB, once standing difficulty maintaining balance due to tendancy to cross/ flex LEs, difficulty maintaining UE support on walker due to writhing movement, became very dizzy on both attempts and needed to sit, BP 91/72.  Pt high fall risk and reports frequent falls at home.  Recommended option of rehab to patient and not agreeable.  discussed 2nd option of home with HH and patient also not interested.  Recommend continued IPPT

## 2022-03-03 NOTE — PLAN OF CARE
Goal Outcome Evaluation:  Plan of Care Reviewed With: patient, spouse    SLP evaluation completed. Will address diet tolerance. Please see note for further details and recommendations.

## 2022-03-04 VITALS
RESPIRATION RATE: 18 BRPM | HEIGHT: 73 IN | SYSTOLIC BLOOD PRESSURE: 169 MMHG | OXYGEN SATURATION: 98 % | DIASTOLIC BLOOD PRESSURE: 105 MMHG | HEART RATE: 60 BPM | WEIGHT: 129.1 LBS | BODY MASS INDEX: 17.11 KG/M2 | TEMPERATURE: 96.9 F

## 2022-03-04 PROBLEM — N39.0 UTI (URINARY TRACT INFECTION): Status: RESOLVED | Noted: 2022-03-02 | Resolved: 2022-03-04

## 2022-03-04 PROBLEM — R55 SYNCOPE: Status: RESOLVED | Noted: 2022-03-03 | Resolved: 2022-03-04

## 2022-03-04 PROCEDURE — 25010000002 HEPARIN (PORCINE) PER 1000 UNITS: Performed by: INTERNAL MEDICINE

## 2022-03-04 PROCEDURE — 25010000002 CEFTRIAXONE PER 250 MG: Performed by: INTERNAL MEDICINE

## 2022-03-04 PROCEDURE — 99217 PR OBSERVATION CARE DISCHARGE MANAGEMENT: CPT | Performed by: NURSE PRACTITIONER

## 2022-03-04 PROCEDURE — G0378 HOSPITAL OBSERVATION PER HR: HCPCS

## 2022-03-04 PROCEDURE — 96372 THER/PROPH/DIAG INJ SC/IM: CPT

## 2022-03-04 RX ORDER — LISINOPRIL 20 MG/1
20 TABLET ORAL
Status: DISCONTINUED | OUTPATIENT
Start: 2022-03-04 | End: 2022-03-04 | Stop reason: HOSPADM

## 2022-03-04 RX ORDER — LISINOPRIL 20 MG/1
20 TABLET ORAL
Status: DISCONTINUED | OUTPATIENT
Start: 2022-03-05 | End: 2022-03-04

## 2022-03-04 RX ORDER — LISINOPRIL 10 MG/1
10 TABLET ORAL ONCE
Status: DISCONTINUED | OUTPATIENT
Start: 2022-03-04 | End: 2022-03-04

## 2022-03-04 RX ADMIN — CARBIDOPA AND LEVODOPA 2 TABLET: 50; 200 TABLET, EXTENDED RELEASE ORAL at 11:25

## 2022-03-04 RX ADMIN — SODIUM CHLORIDE 1 G: 900 INJECTION INTRAVENOUS at 11:25

## 2022-03-04 RX ADMIN — LISINOPRIL 20 MG: 20 TABLET ORAL at 08:45

## 2022-03-04 RX ADMIN — HEPARIN SODIUM 5000 UNITS: 5000 INJECTION, SOLUTION INTRAVENOUS; SUBCUTANEOUS at 08:45

## 2022-03-04 RX ADMIN — CARBIDOPA AND LEVODOPA 2 TABLET: 50; 200 TABLET, EXTENDED RELEASE ORAL at 05:51

## 2022-03-04 RX ADMIN — CARBIDOPA AND LEVODOPA 1 TABLET: 25; 100 TABLET ORAL at 09:00

## 2022-03-04 NOTE — CASE MANAGEMENT/SOCIAL WORK
Case Management Discharge Note      Final Note: I have spoken to Mrs. Carmona by phone today regarding her 's discharge.  She states that she will be providing transportation for him to their home.  I have offered to submit referrals for home health services however they decline these services.  I have spoken with Manda at [facility] who is researching insurance coverage for a Ustep walker which they are requesting.  Manda will f/u with the [facility].  I have faxed order for Ustep walker to [facility].  Mrs. Carmona denies having any other discharge needs at this time.         Selected Continued Care - Discharged on 3/4/2022 Admission date: 3/2/2022 - Discharge disposition: Home or Self Care    Destination    No services have been selected for the patient.              Durable Medical Equipment Coordination complete.    Service Provider Selected Services Address Phone Fax Patient Preferred    [facility]  Durable Medical Equipment [address] [address] [phone] --          Dialysis/Infusion    No services have been selected for the patient.              Home Medical Care    No services have been selected for the patient.              Therapy    No services have been selected for the patient.              Community Resources    No services have been selected for the patient.              Community & DME    No services have been selected for the patient.                       Final Discharge Disposition Code: 01 - home or self-care

## 2022-03-04 NOTE — PLAN OF CARE
Inpatient RN   Plan of Care Update  ________________________________________________    Patient Name:  Nigel Carmona  YOB: 1940  MRN: [id number]  Admit Date:  3/2/2022      Assessment Date:  3/4/2022    Vital Signs stable, On Telemetry, Pt is Normal Sinus Rhythm, Pt currently on room air Pt has been sleeping well with a Pain status of no pain and finding no nausea and no vomiting.    Pt orientation person with LOC of alert.    UOP adequate.        Pt has no requests at this time.         Electronically signed by:  ABEL GILLETTE RN  03/04/22 06:39 EST

## 2022-03-04 NOTE — DISCHARGE PLACEMENT REQUEST
"Case Management  [phone]    Janak Taylor (81 y.o. Male)             Date of Birth Social Security Number Address Home Phone MRN    1940  [address] [id number] [phone] [id number]    Mu-ism Marital Status             None        Admission Date Admission Type Admitting Provider Attending Provider Department, Room/Bed    3/2/22 Emergency Dolly Arboleda DO  [facility] 5G, S561/1    Discharge Date Discharge Disposition Discharge Destination          3/4/2022 Home or Self Care              Attending Provider: (none)   Allergies: No Known Allergies    Isolation: None   Infection: None   Code Status: CPR   Advance Care Planning Activity    Ht: 185.4 cm (72.99\")   Wt: 58.6 kg (129 lb 1.6 oz)    Admission Cmt: None   Principal Problem: None                Active Insurance as of 3/2/2022     Primary Coverage     Payor Plan Insurance Group Employer/Plan Group    ANTHEM MEDICARE REPLACEMENT ANTHEM MEDICARE ADVANTAGE [id number]     Payor Plan Address Payor Plan Phone Number Payor Plan Fax Number Effective Dates    [address] [phone]  2017 - None Entered    [facility] [address]       Subscriber Name Subscriber Birth Date Member ID       JANAK TAYLOR 1940 [id number]                 Emergency Contacts      (Rel.) Home Phone Work Phone Mobile Phone    Carolina Taylor (Spouse) [phone] -- [phone]    Nohemi Burnette (Daughter) -- -- [phone]        [address] [address]  Dept. Phone:  [phone]  Dept. Fax:  [phone] Date Ordered: Mar 4, 2022         Patient:  Janak Taylor MRN:  [id number]   [address] [id number] :  1940  SSN:    Phone: [phone] Sex:  M     Weight: 58.6 kg (129 lb 1.6 oz)         Ht Readings from Last 1 Encounters:   22 185.4 cm (72.99\")         Ney               (Order ID: [id number])  "   Diagnosis:  Syncope, unspecified syncope type (R55 [ICD-10-CM] 780.2 [ICD-9-CM])  Parkinson's disease (HCC) (G20 [ICD-10-CM] 332.0 [ICD-9-CM])   Quantity:  1  Comments: Ustep walker for assistance with mobility r/t Parkinson's     Equipment:  Walker Folding with Wheels  Accessories:  Seat Attachment, Walker  Length of Need (99 Months = Lifetime): 99 Months = Lifetime        Authorizing Provider's Phone: [phone]  Verbal Order Mode: Verbal with readback   Authorizing Provider: Dolly Arboleda DO  Authorizing Provider's NPI: [id number]     Order Entered By: Dede Judd RN 3/4/2022  2:52 PM     Electronically signed by:                Discharge Summary      Nohemi Casillas APRN at [address] [place] [address] Medicine Services  DISCHARGE SUMMARY    Patient Name: Nigel Carmona  : 1940  MRN: [id number]    Date of Admission: 3/2/2022  3:52 PM  Date of Discharge:  3/4/2022  Primary Care Physician: Keegan Baxter MD    Consults     No orders found from 2022 to 3/3/2022.          Hospital Course     Presenting Problem:   UTI (urinary tract infection) [N39.0]  Syncope, unspecified syncope type [R55]    Active Hospital Problems    Diagnosis  POA   • Parkinson's disease (HCC) [G20]  Yes   • Orthostasis [I95.1]  Yes   • Elevated blood pressure reading [R03.0]  Yes      Resolved Hospital Problems    Diagnosis Date Resolved POA   • Syncope [R55] 2022 Yes   • UTI (urinary tract infection) [N39.0] 2022 Yes          Hospital Course:  Nigel Carmona is a 81 y.o. male with HO parkinsons disease, orthosattic hypotension, who has been doing fairly well- until syncopal event yesterday     Possible Syncope vs orthostatic hypotension  -Admission in 2020 for similar symptoms  - Resumed fludrocortisone -- hold for blood pressure over 140. Caused rebound hypertension 180s/110. Lisinopril restarted (patient has been off this home med)  - Recent  echo and carotids were negative in 2020  - PT/OT - family declined   - mild orthostasis. May be tricky balance as discussed with patient and wife. Recommended slow transition and PT/ HH, but currently decline services     HTN   - Has been off medication   -resume lisinopril and norvasc  -hold fludrocortisone for SBP>140     UTI  -rocephin x 3 days  - ucx with strep - likely contaminant     PD  -cont sinemet  - PT/OT recommended     Severe malnutrition  -Cont supplements            Discharge Follow Up Recommendations for outpatient labs/diagnostics:   PCP follow up next week  Monitor BP    Day of Discharge     HPI:   Patient resting in bed with wrist restraints on and asks they be removed. States he did rest last night. No further syncopal events, no dizziness and feeling well. Wife at bedside. Both would like to go home. Currently declining HH services    Review of Systems  Gen- No fevers, chills  CV- No chest pain, palpitations  Resp- No cough, dyspnea  GI- No N/V/D, abd pain      Vital Signs:   Temp:  [96.9 °F (36.1 °C)-98.6 °F (37 °C)] 96.9 °F (36.1 °C)  Heart Rate:  [58-87] 60  Resp:  [17-22] 18  BP: (145-180)/(100-124) 169/105      Physical Exam:  Constitutional: No acute distress, awake, alert  HENT: NCAT, mucous membranes moist  Respiratory: Clear to auscultation bilaterally, respiratory effort normal   Cardiovascular: RRR, no murmurs, rubs, or gallops  Gastrointestinal: Positive bowel sounds, soft, nontender, nondistended  Musculoskeletal: No bilateral ankle edema  Psychiatric: Appropriate affect, cooperative  Neurologic: Oriented x 3, strength symmetric in all extremities, Cranial Nerves grossly intact to confrontation, speech clear  Skin: No rashes      Pertinent  and/or Most Recent Results     LAB RESULTS:      Lab 03/03/22  0501 03/02/22  1603   WBC 8.56 10.47   HEMOGLOBIN 13.1 14.7   HEMATOCRIT 39.9 44.3   PLATELETS 207 232   NEUTROS ABS 6.51 8.53*   IMMATURE GRANS (ABS) 0.02 0.04   LYMPHS ABS 1.13 1.07    MONOS ABS 0.69 0.68   EOS ABS 0.17 0.11   .3* 98.2*         Lab 03/03/22  0501 03/02/22  1603   SODIUM 130* 133*   POTASSIUM 3.9 4.0   CHLORIDE 94* 95*   CO2 27.0 26.0   ANION GAP 9.0 12.0   BUN 20 25*   CREATININE 0.66* 0.84   EGFR 95.4 88.7   GLUCOSE 90 114*   CALCIUM 8.8 9.4   MAGNESIUM  --  2.4         Lab 03/03/22  0501 03/02/22  1603   TOTAL PROTEIN 6.8 7.4   ALBUMIN 3.50 3.90   GLOBULIN 3.3 3.5   ALT (SGPT) <5 <5   AST (SGOT) 10 12   BILIRUBIN 0.4 0.4   ALK PHOS 92 103         Lab 03/03/22  0501 03/02/22  1603   TROPONIN T <0.010 <0.010                 Brief Urine Lab Results  (Last result in the past 365 days)      Color   Clarity   Blood   Leuk Est   Nitrite   Protein   CREAT   Urine HCG        03/02/22 1710 Yellow   Cloudy   Negative   Small (1+)   Negative   Trace               Microbiology Results (last 10 days)     Procedure Component Value - Date/Time    COVID PRE-OP / PRE-PROCEDURE SCREENING ORDER (NO ISOLATION) - Swab, Nasopharynx [[id number]]  (Normal) Collected: 03/02/22 1852    Lab Status: Final result Specimen: Swab from Nasopharynx Updated: 03/02/22 1944    Narrative:      The following orders were created for panel order COVID PRE-OP / PRE-PROCEDURE SCREENING ORDER (NO ISOLATION) - Swab, Nasopharynx.  Procedure                               Abnormality         Status                     ---------                               -----------         ------                     COVID-19 and FLU A/B PCR...[[id number]]  Normal              Final result                 Please view results for these tests on the individual orders.    COVID-19 and FLU A/B PCR - Swab, Nasopharynx [[id number]]  (Normal) Collected: 03/02/22 1852    Lab Status: Final result Specimen: Swab from Nasopharynx Updated: 03/02/22 1944     COVID19 Not Detected     Influenza A PCR Not Detected     Influenza B PCR Not Detected    Narrative:      Fact sheet for providers: https://www.fda.gov/media/972189/download    Fact sheet for  patients: https://www.[employer].gov/media/022650/download    Test performed by PCR.    Urine Culture - Urine, Urine, Clean Catch [[id number]]  (Abnormal) Collected: 03/02/22 1710    Lab Status: Final result Specimen: Urine, Clean Catch Updated: 03/03/22 1529     Urine Culture >100,000 CFU/mL Streptococcus, Alpha Hemolytic     Comment: This organism commonly represents colonization or contamination. No further workup unless requested by provider.             CT Head Without Contrast    Result Date: 3/2/2022  CT HEAD WO CONTRAST-  Date of Exam: 3/2/2022 4:40 PM  Indication: syncope. htn..  Comparison Exams: 10/29/2020  Technique: Multiple axial images were obtained from the skull base to the vertex without the administration of IV contrast. The axial data was used to generate reformatted images in the coronal and sagittal planes. Automated exposure control and iterative reconstruction methods were used.  FINDINGS: There is mild generalized parenchymal volume loss.   There is no acute infarct or hemorrhage.   No abnormal extra-axial fluid collections are seen.   There is no mass effect or hydrocephalus.  There is no evidence of skull fracture.   Visualized paranasal sinuses and mastoid air cells are clear.  The globes and orbits are within normal limits.       1. No acute intracranial abnormality.  This report was finalized on 3/2/2022 4:57 PM by Tyrone Arroyo MD.        Results for orders placed during the hospital encounter of 10/28/20    Duplex Carotid Ultrasound CAR    Interpretation Summary  · Proximal right internal carotid artery plaque without significant stenosis.  · Proximal left internal carotid artery plaque without significant stenosis.      Results for orders placed during the hospital encounter of 10/28/20    Duplex Carotid Ultrasound CAR    Interpretation Summary  · Proximal right internal carotid artery plaque without significant stenosis.  · Proximal left internal carotid artery plaque without significant  stenosis.      Results for orders placed during the hospital encounter of 10/28/20    Adult Transthoracic Echo Complete W/ Cont if Necessary Per Protocol    Interpretation Summary  · Calculated left ventricular EF = 63%  · Trace mitral valve regurgitation is present.  · Estimated right ventricular systolic pressure from tricuspid regurgitation is normal (<35 mmHg). Calculated right ventricular systolic pressure from tricuspid regurgitation is 12 mmHg.      Plan for Follow-up of Pending Labs/Results:     Discharge Details        Discharge Medications      Continue These Medications      Instructions Start Date   amLODIPine 5 MG tablet  Commonly known as: Norvasc   5 mg, Oral, Daily      clonazePAM 1 MG tablet  Commonly known as: KlonoPIN   1 mg, Oral, 2 Times Daily, For anxiety.... 1pm and bedtime      escitalopram 20 MG tablet  Commonly known as: LEXAPRO   10 mg, Oral, Every Night at Bedtime      fludrocortisone 0.1 MG tablet   0.1 mg, Oral, Daily, Hold for Systolic BP>140      lisinopril 10 MG tablet  Commonly known as: PRINIVIL,ZESTRIL   10 mg, Oral, Daily      Rytary 48. MG capsule controlled-release  Generic drug: Carbidopa-Levodopa ER   195 mg, Oral, 3 Times Daily, 3 tabs 3 times per day ......8am, 11am, 4pm      SINEMET PO    mg, Oral, 2 Times Daily, 11am and 3pm             No Known Allergies      Discharge Disposition:  Home or Self Care    Diet:  Hospital:  Diet Order   Procedures   • Diet Regular       Activity:  Activity Instructions     Activity as Tolerated      Other Activity Instructions      Activity Instructions: monitor BP at least daily          Restrictions or Other Recommendations:         CODE STATUS:    Code Status and Medical Interventions:   Ordered at: 03/02/22 2057     Code Status (Patient has no pulse and is not breathing):    CPR (Attempt to Resuscitate)     Medical Interventions (Patient has pulse or is breathing):    Full       No future appointments.    Additional  Instructions for the Follow-ups that You Need to Schedule     Discharge Follow-up with PCP   As directed       Currently Documented PCP:    Keegan Baxter MD    PCP Phone Number:    [phone]     Follow Up Details: next week                     MOE Rock  03/04/22      Time Spent on Discharge:  I spent  45 minutes on this discharge activity which included: face-to-face encounter with the patient, reviewing the data in the system, coordination of the care with the nursing staff as well as consultants, documentation, and entering orders.        Electronically signed by MOE Rock, 03/04/22, 11:26 AM EST.      Electronically signed by Nohemi Casillas APRN at 03/04/22 1139

## 2022-03-04 NOTE — DISCHARGE SUMMARY
[facility] Medicine Services  DISCHARGE SUMMARY    Patient Name: Nigel Carmona  : 1940  MRN: [id number]    Date of Admission: 3/2/2022  3:52 PM  Date of Discharge:  3/4/2022  Primary Care Physician: Keegan Baxter MD    Consults     No orders found from 2022 to 3/3/2022.          Hospital Course     Presenting Problem:   UTI (urinary tract infection) [N39.0]  Syncope, unspecified syncope type [R55]    Active Hospital Problems    Diagnosis  POA   • Parkinson's disease (HCC) [G20]  Yes   • Orthostasis [I95.1]  Yes   • Elevated blood pressure reading [R03.0]  Yes      Resolved Hospital Problems    Diagnosis Date Resolved POA   • Syncope [R55] 2022 Yes   • UTI (urinary tract infection) [N39.0] 2022 Yes          Hospital Course:  Nigel Carmona is a 81 y.o. male with HO parkinsons disease, orthosattic hypotension, who has been doing fairly well- until syncopal event yesterday     Possible Syncope vs orthostatic hypotension  -Admission in 2020 for similar symptoms  - Resumed fludrocortisone -- hold for blood pressure over 140. Caused rebound hypertension 180s/110. Lisinopril restarted (patient has been off this home med)  - Recent echo and carotids were negative in   - PT/OT - family declined   - mild orthostasis. May be tricky balance as discussed with patient and wife. Recommended slow transition and PT/ HH, but currently decline services     HTN   - Has been off medication   -resume lisinopril and norvasc  -hold fludrocortisone for SBP>140     UTI  -rocephin x 3 days  - ucx with strep - likely contaminant     PD  -cont sinemet  - PT/OT recommended     Severe malnutrition  -Cont supplements            Discharge Follow Up Recommendations for outpatient labs/diagnostics:   PCP follow up next week  Monitor BP    Day of Discharge     HPI:   Patient resting in bed with wrist restraints on and asks they be removed. States he did rest last night. No further  syncopal events, no dizziness and feeling well. Wife at bedside. Both would like to go home. Currently declining  services    Review of Systems  Gen- No fevers, chills  CV- No chest pain, palpitations  Resp- No cough, dyspnea  GI- No N/V/D, abd pain      Vital Signs:   Temp:  [96.9 °F (36.1 °C)-98.6 °F (37 °C)] 96.9 °F (36.1 °C)  Heart Rate:  [58-87] 60  Resp:  [17-22] 18  BP: (145-180)/(100-124) 169/105      Physical Exam:  Constitutional: No acute distress, awake, alert  HENT: NCAT, mucous membranes moist  Respiratory: Clear to auscultation bilaterally, respiratory effort normal   Cardiovascular: RRR, no murmurs, rubs, or gallops  Gastrointestinal: Positive bowel sounds, soft, nontender, nondistended  Musculoskeletal: No bilateral ankle edema  Psychiatric: Appropriate affect, cooperative  Neurologic: Oriented x 3, strength symmetric in all extremities, Cranial Nerves grossly intact to confrontation, speech clear  Skin: No rashes      Pertinent  and/or Most Recent Results     LAB RESULTS:      Lab 03/03/22  0501 03/02/22  1603   WBC 8.56 10.47   HEMOGLOBIN 13.1 14.7   HEMATOCRIT 39.9 44.3   PLATELETS 207 232   NEUTROS ABS 6.51 8.53*   IMMATURE GRANS (ABS) 0.02 0.04   LYMPHS ABS 1.13 1.07   MONOS ABS 0.69 0.68   EOS ABS 0.17 0.11   .3* 98.2*         Lab 03/03/22  0501 03/02/22  1603   SODIUM 130* 133*   POTASSIUM 3.9 4.0   CHLORIDE 94* 95*   CO2 27.0 26.0   ANION GAP 9.0 12.0   BUN 20 25*   CREATININE 0.66* 0.84   EGFR 95.4 88.7   GLUCOSE 90 114*   CALCIUM 8.8 9.4   MAGNESIUM  --  2.4         Lab 03/03/22  0501 03/02/22  1603   TOTAL PROTEIN 6.8 7.4   ALBUMIN 3.50 3.90   GLOBULIN 3.3 3.5   ALT (SGPT) <5 <5   AST (SGOT) 10 12   BILIRUBIN 0.4 0.4   ALK PHOS 92 103         Lab 03/03/22  0501 03/02/22  1603   TROPONIN T <0.010 <0.010                 Brief Urine Lab Results  (Last result in the past 365 days)      Color   Clarity   Blood   Leuk Est   Nitrite   Protein   CREAT   Urine HCG        03/02/22 1710  Yellow   Cloudy   Negative   Small (1+)   Negative   Trace               Microbiology Results (last 10 days)     Procedure Component Value - Date/Time    COVID PRE-OP / PRE-PROCEDURE SCREENING ORDER (NO ISOLATION) - Swab, Nasopharynx [[id number]]  (Normal) Collected: 03/02/22 1852    Lab Status: Final result Specimen: Swab from Nasopharynx Updated: 03/02/22 1944    Narrative:      The following orders were created for panel order COVID PRE-OP / PRE-PROCEDURE SCREENING ORDER (NO ISOLATION) - Swab, Nasopharynx.  Procedure                               Abnormality         Status                     ---------                               -----------         ------                     COVID-19 and FLU A/B PCR...[[id number]]  Normal              Final result                 Please view results for these tests on the individual orders.    COVID-19 and FLU A/B PCR - Swab, Nasopharynx [[id number]]  (Normal) Collected: 03/02/22 1852    Lab Status: Final result Specimen: Swab from Nasopharynx Updated: 03/02/22 1944     COVID19 Not Detected     Influenza A PCR Not Detected     Influenza B PCR Not Detected    Narrative:      Fact sheet for providers: https://www.fda.gov/media/575915/download    Fact sheet for patients: https://www.fda.gov/media/156111/download    Test performed by PCR.    Urine Culture - Urine, Urine, Clean Catch [[id number]]  (Abnormal) Collected: 03/02/22 1710    Lab Status: Final result Specimen: Urine, Clean Catch Updated: 03/03/22 1529     Urine Culture >100,000 CFU/mL Streptococcus, Alpha Hemolytic     Comment: This organism commonly represents colonization or contamination. No further workup unless requested by provider.             CT Head Without Contrast    Result Date: 3/2/2022  CT HEAD WO CONTRAST-  Date of Exam: 3/2/2022 4:40 PM  Indication: syncope. htn..  Comparison Exams: 10/29/2020  Technique: Multiple axial images were obtained from the skull base to the vertex without the administration of IV  contrast. The axial data was used to generate reformatted images in the coronal and sagittal planes. Automated exposure control and iterative reconstruction methods were used.  FINDINGS: There is mild generalized parenchymal volume loss.   There is no acute infarct or hemorrhage.   No abnormal extra-axial fluid collections are seen.   There is no mass effect or hydrocephalus.  There is no evidence of skull fracture.   Visualized paranasal sinuses and mastoid air cells are clear.  The globes and orbits are within normal limits.       1. No acute intracranial abnormality.  This report was finalized on 3/2/2022 4:57 PM by Tyrone Arroyo MD.        Results for orders placed during the hospital encounter of 10/28/20    Duplex Carotid Ultrasound CAR    Interpretation Summary  · Proximal right internal carotid artery plaque without significant stenosis.  · Proximal left internal carotid artery plaque without significant stenosis.      Results for orders placed during the hospital encounter of 10/28/20    Duplex Carotid Ultrasound CAR    Interpretation Summary  · Proximal right internal carotid artery plaque without significant stenosis.  · Proximal left internal carotid artery plaque without significant stenosis.      Results for orders placed during the hospital encounter of 10/28/20    Adult Transthoracic Echo Complete W/ Cont if Necessary Per Protocol    Interpretation Summary  · Calculated left ventricular EF = 63%  · Trace mitral valve regurgitation is present.  · Estimated right ventricular systolic pressure from tricuspid regurgitation is normal (<35 mmHg). Calculated right ventricular systolic pressure from tricuspid regurgitation is 12 mmHg.      Plan for Follow-up of Pending Labs/Results:     Discharge Details        Discharge Medications      Continue These Medications      Instructions Start Date   amLODIPine 5 MG tablet  Commonly known as: Norvasc   5 mg, Oral, Daily      clonazePAM 1 MG tablet  Commonly known  as: KlonoPIN   1 mg, Oral, 2 Times Daily, For anxiety.... 1pm and bedtime      escitalopram 20 MG tablet  Commonly known as: LEXAPRO   10 mg, Oral, Every Night at Bedtime      fludrocortisone 0.1 MG tablet   0.1 mg, Oral, Daily, Hold for Systolic BP>140      lisinopril 10 MG tablet  Commonly known as: PRINIVIL,ZESTRIL   10 mg, Oral, Daily      Rytary 48. MG capsule controlled-release  Generic drug: Carbidopa-Levodopa ER   195 mg, Oral, 3 Times Daily, 3 tabs 3 times per day ......8am, 11am, 4pm      SINEMET PO    mg, Oral, 2 Times Daily, 11am and 3pm             No Known Allergies      Discharge Disposition:  Home or Self Care    Diet:  Hospital:  Diet Order   Procedures   • Diet Regular       Activity:  Activity Instructions     Activity as Tolerated      Other Activity Instructions      Activity Instructions: monitor BP at least daily          Restrictions or Other Recommendations:         CODE STATUS:    Code Status and Medical Interventions:   Ordered at: 03/02/22 2057     Code Status (Patient has no pulse and is not breathing):    CPR (Attempt to Resuscitate)     Medical Interventions (Patient has pulse or is breathing):    Full       No future appointments.    Additional Instructions for the Follow-ups that You Need to Schedule     Discharge Follow-up with PCP   As directed       Currently Documented PCP:    Keegan Baxter MD    PCP Phone Number:    [phone]     Follow Up Details: next week                     MOE Rock  03/04/22      Time Spent on Discharge:  I spent  45 minutes on this discharge activity which included: face-to-face encounter with the patient, reviewing the data in the system, coordination of the care with the nursing staff as well as consultants, documentation, and entering orders.        Electronically signed by MOE Rock, 03/04/22, 11:26 AM EST.

## 2022-03-10 LAB
QT INTERVAL: 408 MS
QTC INTERVAL: 437 MS